# Patient Record
Sex: MALE | Race: ASIAN | NOT HISPANIC OR LATINO
[De-identification: names, ages, dates, MRNs, and addresses within clinical notes are randomized per-mention and may not be internally consistent; named-entity substitution may affect disease eponyms.]

---

## 2017-03-16 ENCOUNTER — APPOINTMENT (OUTPATIENT)
Dept: CARDIOLOGY | Facility: CLINIC | Age: 82
End: 2017-03-16

## 2018-09-11 ENCOUNTER — APPOINTMENT (OUTPATIENT)
Dept: OPHTHALMOLOGY | Facility: CLINIC | Age: 83
End: 2018-09-11

## 2018-11-06 ENCOUNTER — APPOINTMENT (OUTPATIENT)
Dept: OPHTHALMOLOGY | Facility: CLINIC | Age: 83
End: 2018-11-06
Payer: MEDICARE

## 2018-11-06 PROCEDURE — 92004 COMPRE OPH EXAM NEW PT 1/>: CPT

## 2018-11-06 PROCEDURE — 92250 FUNDUS PHOTOGRAPHY W/I&R: CPT

## 2018-11-06 PROCEDURE — 92020 GONIOSCOPY: CPT

## 2018-11-27 ENCOUNTER — APPOINTMENT (OUTPATIENT)
Dept: OPHTHALMOLOGY | Facility: CLINIC | Age: 83
End: 2018-11-27

## 2021-07-26 ENCOUNTER — APPOINTMENT (OUTPATIENT)
Dept: OPHTHALMOLOGY | Facility: CLINIC | Age: 86
End: 2021-07-26

## 2023-03-28 ENCOUNTER — INPATIENT (INPATIENT)
Facility: HOSPITAL | Age: 88
LOS: 6 days | Discharge: NURSING, REHAB OR LONG-TERM CARE | End: 2023-04-04
Attending: HOSPITALIST | Admitting: HOSPITALIST
Payer: MEDICARE

## 2023-03-28 VITALS
TEMPERATURE: 99 F | SYSTOLIC BLOOD PRESSURE: 150 MMHG | OXYGEN SATURATION: 100 % | DIASTOLIC BLOOD PRESSURE: 84 MMHG | RESPIRATION RATE: 16 BRPM | WEIGHT: 158.07 LBS | HEART RATE: 88 BPM

## 2023-03-28 DIAGNOSIS — E78.5 HYPERLIPIDEMIA, UNSPECIFIED: ICD-10-CM

## 2023-03-28 DIAGNOSIS — M79.18 MYALGIA, OTHER SITE: ICD-10-CM

## 2023-03-28 DIAGNOSIS — R93.89 ABNORMAL FINDINGS ON DIAGNOSTIC IMAGING OF OTHER SPECIFIED BODY STRUCTURES: ICD-10-CM

## 2023-03-28 DIAGNOSIS — Z29.9 ENCOUNTER FOR PROPHYLACTIC MEASURES, UNSPECIFIED: ICD-10-CM

## 2023-03-28 DIAGNOSIS — I63.9 CEREBRAL INFARCTION, UNSPECIFIED: ICD-10-CM
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
## 2023-03-28 DIAGNOSIS — E87.1 HYPO-OSMOLALITY AND HYPONATREMIA: ICD-10-CM

## 2023-03-28 DIAGNOSIS — R26.81 UNSTEADINESS ON FEET: ICD-10-CM

## 2023-03-28 DIAGNOSIS — R42 DIZZINESS AND GIDDINESS: ICD-10-CM

## 2023-03-28 DIAGNOSIS — E11.9 TYPE 2 DIABETES MELLITUS WITHOUT COMPLICATIONS: ICD-10-CM

## 2023-03-28 DIAGNOSIS — E83.52 HYPERCALCEMIA: ICD-10-CM

## 2023-03-28 DIAGNOSIS — I10 ESSENTIAL (PRIMARY) HYPERTENSION: ICD-10-CM

## 2023-03-28 LAB
GLUCOSE BLDC GLUCOMTR-MCNC: 266 MG/DL — HIGH (ref 70–99)
GLUCOSE BLDC GLUCOMTR-MCNC: 423 MG/DL — HIGH (ref 70–99)

## 2023-03-28 PROCEDURE — 71045 X-RAY EXAM CHEST 1 VIEW: CPT | Mod: 26

## 2023-03-28 PROCEDURE — 70498 CT ANGIOGRAPHY NECK: CPT | Mod: 26

## 2023-03-28 PROCEDURE — 70496 CT ANGIOGRAPHY HEAD: CPT | Mod: 26

## 2023-03-28 PROCEDURE — 99285 EMERGENCY DEPT VISIT HI MDM: CPT

## 2023-03-28 PROCEDURE — 99223 1ST HOSP IP/OBS HIGH 75: CPT

## 2023-03-28 RX ORDER — BRIMONIDINE TARTRATE 2 MG/MG
1 SOLUTION/ DROPS OPHTHALMIC
Refills: 0 | Status: DISCONTINUED | OUTPATIENT
Start: 2023-03-28 | End: 2023-04-04

## 2023-03-28 RX ORDER — LOSARTAN POTASSIUM 100 MG/1
1 TABLET, FILM COATED ORAL
Refills: 0 | DISCHARGE

## 2023-03-28 RX ORDER — SODIUM CHLORIDE 9 MG/ML
1000 INJECTION, SOLUTION INTRAVENOUS
Refills: 0 | Status: DISCONTINUED | OUTPATIENT
Start: 2023-03-28 | End: 2023-04-04

## 2023-03-28 RX ORDER — ASPIRIN/CALCIUM CARB/MAGNESIUM 324 MG
81 TABLET ORAL DAILY
Refills: 0 | Status: DISCONTINUED | OUTPATIENT
Start: 2023-03-28 | End: 2023-04-04

## 2023-03-28 RX ORDER — SODIUM CHLORIDE 9 MG/ML
500 INJECTION INTRAMUSCULAR; INTRAVENOUS; SUBCUTANEOUS ONCE
Refills: 0 | Status: DISCONTINUED | OUTPATIENT
Start: 2023-03-28 | End: 2023-04-04

## 2023-03-28 RX ORDER — METFORMIN HYDROCHLORIDE 850 MG/1
1 TABLET ORAL
Refills: 0 | DISCHARGE

## 2023-03-28 RX ORDER — AMLODIPINE BESYLATE 2.5 MG/1
1 TABLET ORAL
Refills: 0 | DISCHARGE

## 2023-03-28 RX ORDER — GLUCAGON INJECTION, SOLUTION 0.5 MG/.1ML
1 INJECTION, SOLUTION SUBCUTANEOUS ONCE
Refills: 0 | Status: DISCONTINUED | OUTPATIENT
Start: 2023-03-28 | End: 2023-04-04

## 2023-03-28 RX ORDER — LIDOCAINE 4 G/100G
1 CREAM TOPICAL DAILY
Refills: 0 | Status: DISCONTINUED | OUTPATIENT
Start: 2023-03-28 | End: 2023-04-04

## 2023-03-28 RX ORDER — INSULIN LISPRO 100/ML
VIAL (ML) SUBCUTANEOUS
Refills: 0 | Status: DISCONTINUED | OUTPATIENT
Start: 2023-03-28 | End: 2023-04-04

## 2023-03-28 RX ORDER — DEXTROSE 50 % IN WATER 50 %
12.5 SYRINGE (ML) INTRAVENOUS ONCE
Refills: 0 | Status: DISCONTINUED | OUTPATIENT
Start: 2023-03-28 | End: 2023-04-04

## 2023-03-28 RX ORDER — LOSARTAN POTASSIUM 100 MG/1
50 TABLET, FILM COATED ORAL DAILY
Refills: 0 | Status: DISCONTINUED | OUTPATIENT
Start: 2023-03-28 | End: 2023-04-04

## 2023-03-28 RX ORDER — LATANOPROST 0.05 MG/ML
1 SOLUTION/ DROPS OPHTHALMIC; TOPICAL AT BEDTIME
Refills: 0 | Status: DISCONTINUED | OUTPATIENT
Start: 2023-03-28 | End: 2023-04-04

## 2023-03-28 RX ORDER — INSULIN LISPRO 100/ML
VIAL (ML) SUBCUTANEOUS AT BEDTIME
Refills: 0 | Status: DISCONTINUED | OUTPATIENT
Start: 2023-03-28 | End: 2023-04-04

## 2023-03-28 RX ORDER — HEPARIN SODIUM 5000 [USP'U]/ML
5000 INJECTION INTRAVENOUS; SUBCUTANEOUS EVERY 8 HOURS
Refills: 0 | Status: DISCONTINUED | OUTPATIENT
Start: 2023-03-28 | End: 2023-04-04

## 2023-03-28 RX ORDER — DEXTROSE 50 % IN WATER 50 %
25 SYRINGE (ML) INTRAVENOUS ONCE
Refills: 0 | Status: DISCONTINUED | OUTPATIENT
Start: 2023-03-28 | End: 2023-04-04

## 2023-03-28 RX ORDER — TRAVOPROST 0.04 MG/ML
1 SOLUTION/ DROPS OPHTHALMIC
Refills: 0 | DISCHARGE

## 2023-03-28 RX ORDER — ACETAMINOPHEN 500 MG
650 TABLET ORAL EVERY 6 HOURS
Refills: 0 | Status: DISCONTINUED | OUTPATIENT
Start: 2023-03-28 | End: 2023-03-29

## 2023-03-28 RX ORDER — AMLODIPINE BESYLATE 2.5 MG/1
5 TABLET ORAL DAILY
Refills: 0 | Status: DISCONTINUED | OUTPATIENT
Start: 2023-03-28 | End: 2023-04-04

## 2023-03-28 RX ORDER — DEXTROSE 50 % IN WATER 50 %
15 SYRINGE (ML) INTRAVENOUS ONCE
Refills: 0 | Status: DISCONTINUED | OUTPATIENT
Start: 2023-03-28 | End: 2023-04-04

## 2023-03-28 RX ADMIN — HEPARIN SODIUM 5000 UNIT(S): 5000 INJECTION INTRAVENOUS; SUBCUTANEOUS at 21:27

## 2023-03-28 RX ADMIN — Medication 4: at 21:26

## 2023-03-28 NOTE — ED ADULT TRIAGE NOTE - CHIEF COMPLAINT QUOTE
downtime triage: fs 324    unwitnessed fall in bathtub yesterday. States possible syncope, c/o right rib pain , Left elbow  + head trama    PMH DM2, HTN, CVA 2016, Vertigo

## 2023-03-28 NOTE — H&P ADULT - NSHPSOCIALHISTORY_GEN_ALL_CORE
Lives at home with wife    Smoking - quit 15 years ago; prior to that smoked 2ppd for 3 years (6 ppy hx)  Drinking - stopped 2 years ago; prior to that, had 2 drinks daily  No recreational drug use

## 2023-03-28 NOTE — H&P ADULT - PROBLEM SELECTOR PLAN 7
- Hx of stroke  - CT brain scans negative for new stroke  - C/w aspirin, not on statin per family  - F/u lipid panel  - PT eval pending - Hx of stroke, no focal deficits noted on examination  - CT brain scans negative for evidence of new stroke  - C/w aspirin, not on statin per family  - Lipid panel in AM  - PT eval pending - Per family, no longer on statin  - Lipid panel in AM - On presentation, CMP glucose 345 with POCT   - Home regimen includes metformin 500 BID  - Will initiate ISS while inpatient  - Monitor FS - goal for 140-180  - A1c in AM

## 2023-03-28 NOTE — H&P ADULT - PROBLEM SELECTOR PLAN 11
DVT ppx: Heparin subQ  Diet: Consistent carb and DASH/TLC diet  Disposition: Pending clinical course  Code Status: Full code

## 2023-03-28 NOTE — H&P ADULT - PROBLEM SELECTOR PLAN 2
- Likely due to fall on right side and left elbow  - CXR not demonstrating any overt rib fractures  - Obtain Xray left elbow to r/o fracture  - Acetaminophen and lidocaine patch for analgesia  - Monitor pain levels

## 2023-03-28 NOTE — ED PROVIDER NOTE - OBJECTIVE STATEMENT
87-year-old male history diabetes, hypertension, CVA and vertigo presents status post unwitnessed fall in the bathtub yesterday evening.  Unclear if he lost consciousness though he does not exactly remember the circumstances of the fall, and he is endorsing a head strike.  Wife at bedside states that he has seemed more confused and out of it for the past week.  Currently seems to be at his baseline mental status per wife at bedside.  Reports mild headache and left elbow pain as well as right-sided rib pain.  Otherwise no chest pain no shortness of breath no lightheadedness no dizziness.  Per patient and wife.  He has been more unsteady on his feet of late and had more difficulty ambulating.  Otherwise no fevers, chills, sick contacts, nausea, vomiting, abdominal pain, diarrhea, other complaints at this time.

## 2023-03-28 NOTE — H&P ADULT - PROBLEM SELECTOR PLAN 10
DVT ppx: Heparin subQ  Diet: Consistent carb and DASH/TLC diet  Disposition: Pending clinical course  Code Status: Full code - Reported hx of vertigo, however, does not appear to be contributory to patient current presentation  - Home regimen includes meclizine  - Hold meclizine while inpatient  - PT evaluation pending

## 2023-03-28 NOTE — ED PROVIDER NOTE - PROGRESS NOTE DETAILS
Tony Mukherjee MD (PGY-3): Patient resting comfortably.  CT/CTA without any abnormalities.  No concern for intracranial injury or acute large vessel occlusion.  Case discussed with hospitalist who accepts patient for admission for syncope evaluation and PT eval.

## 2023-03-28 NOTE — H&P ADULT - PROBLEM SELECTOR PLAN 5
- On presentation, CMP glucose 345 with POCT   - Home regimen includes metformin 500 BID  - Will initiate ISS while inpatient  - Monitor FS - goal for 140-180  - A1c in AM - On presentation,   - Home regimen includes losartan 50 and amlodipine 5  - C/w home regimen with hold parameters  - Monitor blood pressure and follow-up orthostatic hypotension results - CXR notes possible aneurysmal aorta, new finding since last CXR  - Patient is currently asymptomatic, will recommend that patient receive CTA chest Aorta on discharge for further characterization - CXR notes possible aneurysmal aorta, new finding since last CXR  - Patient is currently asymptomatic, recommend that patient follow-up outpatient with primary care doctor for further evaluation

## 2023-03-28 NOTE — H&P ADULT - PROBLEM SELECTOR PLAN 3
- On admission, Na 130  - Glucose on admission 345; corrected Na 134  - Will give 500 cc bolus and reassess Na with AM labs  - Trend BMP
No

## 2023-03-28 NOTE — H&P ADULT - HISTORY OF PRESENT ILLNESS
87M PMHx HTN, T2DM, CVA, vertigo presents s/p unwitnessed fall.    In the ED:  VS: /84, HR 88, RR 16, O2 Sat 100 on RA, Tmax 98.8  Labs: CBC WNL. Coags WNL. CMP - Na 130, BUN/Cr 18/0.73, glucose 345, Ca 10.6. RVP negative.  Imaging: CTH negative, CTA head and neck negative. CXR with possible aneurysmal aorta, otherwise negative. 87M PMHx HTN, T2DM, CVA, vertigo presents s/p unwitnessed fall. Patient states that he was attempting to use the bathroom on the morning of admission and admits to slipping on a wet spot on the floor. He says that he fell on his right side, including head. He denies loss of consciousness. He states he was on the floor for approximately 3 minutes when his wife and son came to help him up. He was transported to the ED immediately for evaluation. Patient states that prior to the fall, he did not experience any chest pain, lightheadedness, dizziness, palpitations, extremity weakness.    In the ED:  VS: /84, HR 88, RR 16, O2 Sat 100 on RA, Tmax 98.8  Labs: CBC WNL. Coags WNL. CMP - Na 130, BUN/Cr 18/0.73, glucose 345, Ca 10.6. RVP negative.  Imaging: CTH negative, CTA head and neck negative. CXR with possible aneurysmal aorta, otherwise negative.  EKG: NSR at 82 bpm, left atrial enlargement, left axis deviation, left ventricular hypertrophy with QRS widening, QTc 450. 87M PMHx HTN, T2DM, CVA, vertigo presents s/p unwitnessed fall. Patient states that he was attempting to use the bathroom on the morning of admission and admits to slipping on a wet spot on the floor. He says that he fell on his right side, including head. He denies loss of consciousness. He states he was on the floor for approximately 3 minutes when his wife and son came to help him up. He was transported to the ED immediately for evaluation. Patient states that prior to the fall, he did not experience any chest pain, lightheadedness, dizziness, palpitations, extremity weakness, or any other related symptoms.    In the ED:  VS: /84, HR 88, RR 16, O2 Sat 100 on RA, Tmax 98.8  Labs: CBC WNL. Coags WNL. CMP - Na 130, BUN/Cr 18/0.73, glucose 345, Ca 10.6. RVP negative.  Imaging: CTH negative, CTA head and neck negative. CXR with possible aneurysmal aorta, otherwise negative.  EKG: NSR at 82 bpm, left atrial enlargement, left axis deviation, left ventricular hypertrophy with QRS widening, QTc 450.

## 2023-03-28 NOTE — H&P ADULT - PROBLEM SELECTOR PLAN 9
- Reported hx of vertigo, however, does not appear to be contributory to patient current presentation  - Home regimen includes meclizine  - Hold meclizine while inpatient  - PT evaluation pending - Hx of stroke, no focal deficits noted on examination  - CT brain scans negative for evidence of new stroke  - C/w aspirin, not on statin per family  - Lipid panel in AM  - PT evaluation pending

## 2023-03-28 NOTE — H&P ADULT - PROBLEM SELECTOR PLAN 1
- Acute presentation for unwitnessed fall; per patient and son, appears to be mechanical in nature. Less likely but other ddx to be considered includes cardiogenic syncope, stroke, infectious (i.e. UTI), volume depletion  - CTH negative, CTA head and neck negative  - Will obtain UA/UCx to r/o UTI  - Will obtain orthostatic vital signs to assess for volume depletion  - EKG demonstrating NSR at 82 bpm, left atrial enlargement, left axis deviation, left ventricular hypertrophy with QRS widening, QTc 450. Will obtain TTE to rule out cardiac abnormality  - PT evaluation for disposition planning

## 2023-03-28 NOTE — H&P ADULT - PROBLEM SELECTOR PLAN 8
DVT ppx: Heparin subQ  Diet: Consistent carb and DASH/TLC diet  Disposition: Pending clinical course  Code Status: Full code - Hx of stroke, no focal deficits noted on examination  - CT brain scans negative for evidence of new stroke  - C/w aspirin, not on statin per family  - Lipid panel in AM  - PT evaluation pending - Per family, no longer on statin  - Lipid panel in AM

## 2023-03-28 NOTE — ED PROVIDER NOTE - ATTENDING CONTRIBUTION TO CARE
I (Dhruv) agree with above, I performed a history and physical. Counseled [facility] medical staff, physician assistant, and/or medical student on medical decision making as documented. Medical decisions and treatment interventions were made in real time during the patient encounter. Additionally and/or with the following exceptions:Patient is 87-year-old past medical history diabetes, hypertension, CVA, vertigo who had an unwitnessed fall.  It is unclear if he lost consciousness.  Wife also states that he seemed more confused.  He was 5 out of 5 in all extremities, ambulation was initially deferred.  Otherwise well-appearing.  CBC within normal limits, coagulation studies within normal limits, comprehensive metabolic panel normal, virology studies negative.  EKG was nonischemic, CTA of the head and neck was negative patient was endorsed to the medical team for high risk syncope work-up.  Required admission for continued work-up.  The patient's condition was not amenable to outpatient treatment due either the lack of feasibility of outpatient care coordination, possibility for further decompensation with adverse outcome if discharge, or treatments and diagnostic  modalities only available during an inpatient hospitalization.

## 2023-03-28 NOTE — H&P ADULT - PROBLEM SELECTOR PLAN 4
- On presentation,   - Home regimen includes losartan 50 and amlodipine 5  - C/w home regimen with hold parameters  - Monitor blood pressure and follow-up orthostatic hypotension results - Mildly elevated calcium to 10.6  - S/p 500 cc bolus of NS  - Trend BMP for resolution of hyperCa

## 2023-03-28 NOTE — ED PROVIDER NOTE - PHYSICAL EXAMINATION
PHYSICAL EXAM:  GENERAL: Sitting comfortable in bed, in no acute distress  HENMT: Atraumatic, moist mucous membranes, no oropharyngeal exudates or vesicles, uvula is midline EYES: Clear bilaterally, PERRL, EOMs intact b/l  HEART: RRR, S1/S2, no murmur/gallops/rubs  RESPIRATORY: Clear to auscultation bilaterally, no wheezes/rhonchi/rales  ABDOMEN: +BS, soft, nontender, nondistended  EXTREMITIES: No lower extremity edema, +2 radial pulses b/l  NEURO:  A&Ox4, no focal motor deficits or sensory deficits   Heme/LYMPH: No ecchymosis or bruising, no anterior/posterior cervical or supraclavicular LAD  SKIN:  Skin normal, warm, dry and intact. No evidence of rash.

## 2023-03-28 NOTE — H&P ADULT - PROBLEM SELECTOR PLAN 6
- Per family, no longer on statin  - Lipid panel in AM - On presentation, CMP glucose 345 with POCT   - Home regimen includes metformin 500 BID  - Will initiate ISS while inpatient  - Monitor FS - goal for 140-180  - A1c in AM - On presentation,   - Home regimen includes losartan 50 and amlodipine 5  - C/w home regimen with hold parameters  - Monitor blood pressure and follow-up orthostatic hypotension results

## 2023-03-28 NOTE — ED PROVIDER NOTE - CLINICAL SUMMARY MEDICAL DECISION MAKING FREE TEXT BOX
Tnoy Mukherjee MD (PGY-3): 87-year-old male history of remote CVA, hypertension, diabetes, vertigo presents status post unwitnessed fall yesterday evening cannot remember if he lost consciousness.  Positive head strike.  Now complaining of right-sided rib pain.  Vital signs grossly within normal limits.    Patient without significant focal neurologic findings on exam.  Concern for syncope.  Will obtain CBC, CMP to evaluate for anemia and electrolyte abnormalities.  Will obtain chest x-ray to evaluate for underlying pneumonia/rib fractures from fall.  Will obtain CT Noncon and CTA to evaluate for possible stroke.  And reassess

## 2023-03-28 NOTE — H&P ADULT - NSICDXPASTMEDICALHX_GEN_ALL_CORE_FT
PAST MEDICAL HISTORY:  CVA (cerebrovascular accident)     HTN (hypertension)     Hyperlipidemia     Type 2 diabetes mellitus

## 2023-03-28 NOTE — H&P ADULT - ASSESSMENT
87M PMHx HTN, T2DM, CVA, vertigo presents s/p unwitnessed fall. 87M PMHx HTN, T2DM, CVA, vertigo presents s/p unwitnessed fall likely of mechanical origin.

## 2023-03-29 DIAGNOSIS — R55 SYNCOPE AND COLLAPSE: ICD-10-CM

## 2023-03-29 LAB
A1C WITH ESTIMATED AVERAGE GLUCOSE RESULT: 9.7 % — HIGH (ref 4–5.6)
ANION GAP SERPL CALC-SCNC: 14 MMOL/L — SIGNIFICANT CHANGE UP (ref 7–14)
BUN SERPL-MCNC: 18 MG/DL — SIGNIFICANT CHANGE UP (ref 7–23)
CALCIUM SERPL-MCNC: 9.7 MG/DL — SIGNIFICANT CHANGE UP (ref 8.4–10.5)
CHLORIDE SERPL-SCNC: 102 MMOL/L — SIGNIFICANT CHANGE UP (ref 98–107)
CHOLEST SERPL-MCNC: 198 MG/DL — SIGNIFICANT CHANGE UP
CO2 SERPL-SCNC: 21 MMOL/L — LOW (ref 22–31)
CREAT SERPL-MCNC: 0.75 MG/DL — SIGNIFICANT CHANGE UP (ref 0.5–1.3)
EGFR: 88 ML/MIN/1.73M2 — SIGNIFICANT CHANGE UP
ESTIMATED AVERAGE GLUCOSE: 232 — SIGNIFICANT CHANGE UP
GLUCOSE BLDC GLUCOMTR-MCNC: 201 MG/DL — HIGH (ref 70–99)
GLUCOSE SERPL-MCNC: 210 MG/DL — HIGH (ref 70–99)
HDLC SERPL-MCNC: 56 MG/DL — SIGNIFICANT CHANGE UP
LIPID PNL WITH DIRECT LDL SERPL: 121 MG/DL — HIGH
MAGNESIUM SERPL-MCNC: 2 MG/DL — SIGNIFICANT CHANGE UP (ref 1.6–2.6)
NON HDL CHOLESTEROL: 142 MG/DL — HIGH
PHOSPHATE SERPL-MCNC: 2 MG/DL — LOW (ref 2.5–4.5)
POTASSIUM SERPL-MCNC: 3.6 MMOL/L — SIGNIFICANT CHANGE UP (ref 3.5–5.3)
POTASSIUM SERPL-SCNC: 3.6 MMOL/L — SIGNIFICANT CHANGE UP (ref 3.5–5.3)
SODIUM SERPL-SCNC: 137 MMOL/L — SIGNIFICANT CHANGE UP (ref 135–145)
SODIUM UR-SCNC: 68 MMOL/L — SIGNIFICANT CHANGE UP
TRIGL SERPL-MCNC: 107 MG/DL — SIGNIFICANT CHANGE UP

## 2023-03-29 PROCEDURE — 93306 TTE W/DOPPLER COMPLETE: CPT | Mod: 26

## 2023-03-29 PROCEDURE — 99233 SBSQ HOSP IP/OBS HIGH 50: CPT

## 2023-03-29 PROCEDURE — 73070 X-RAY EXAM OF ELBOW: CPT | Mod: 26,LT

## 2023-03-29 RX ORDER — POLYETHYLENE GLYCOL 3350 17 G/17G
17 POWDER, FOR SOLUTION ORAL DAILY
Refills: 0 | Status: DISCONTINUED | OUTPATIENT
Start: 2023-03-29 | End: 2023-03-30

## 2023-03-29 RX ORDER — SENNA PLUS 8.6 MG/1
2 TABLET ORAL AT BEDTIME
Refills: 0 | Status: DISCONTINUED | OUTPATIENT
Start: 2023-03-29 | End: 2023-04-04

## 2023-03-29 RX ORDER — ACETAMINOPHEN 500 MG
650 TABLET ORAL EVERY 6 HOURS
Refills: 0 | Status: DISCONTINUED | OUTPATIENT
Start: 2023-03-29 | End: 2023-04-04

## 2023-03-29 RX ORDER — SODIUM,POTASSIUM PHOSPHATES 278-250MG
1 POWDER IN PACKET (EA) ORAL
Refills: 0 | Status: COMPLETED | OUTPATIENT
Start: 2023-03-29 | End: 2023-03-30

## 2023-03-29 RX ORDER — INFLUENZA VIRUS VACCINE 15; 15; 15; 15 UG/.5ML; UG/.5ML; UG/.5ML; UG/.5ML
0.7 SUSPENSION INTRAMUSCULAR ONCE
Refills: 0 | Status: DISCONTINUED | OUTPATIENT
Start: 2023-03-29 | End: 2023-04-04

## 2023-03-29 RX ORDER — TRAMADOL HYDROCHLORIDE 50 MG/1
25 TABLET ORAL EVERY 8 HOURS
Refills: 0 | Status: DISCONTINUED | OUTPATIENT
Start: 2023-03-29 | End: 2023-04-04

## 2023-03-29 RX ADMIN — Medication 2: at 08:51

## 2023-03-29 RX ADMIN — BRIMONIDINE TARTRATE 1 DROP(S): 2 SOLUTION/ DROPS OPHTHALMIC at 17:37

## 2023-03-29 RX ADMIN — Medication 81 MILLIGRAM(S): at 17:36

## 2023-03-29 RX ADMIN — SENNA PLUS 2 TABLET(S): 8.6 TABLET ORAL at 21:58

## 2023-03-29 RX ADMIN — Medication 1 PACKET(S): at 11:49

## 2023-03-29 RX ADMIN — Medication 1 PACKET(S): at 17:33

## 2023-03-29 RX ADMIN — BRIMONIDINE TARTRATE 1 DROP(S): 2 SOLUTION/ DROPS OPHTHALMIC at 06:42

## 2023-03-29 RX ADMIN — HEPARIN SODIUM 5000 UNIT(S): 5000 INJECTION INTRAVENOUS; SUBCUTANEOUS at 06:25

## 2023-03-29 RX ADMIN — Medication 650 MILLIGRAM(S): at 02:00

## 2023-03-29 RX ADMIN — HEPARIN SODIUM 5000 UNIT(S): 5000 INJECTION INTRAVENOUS; SUBCUTANEOUS at 17:34

## 2023-03-29 RX ADMIN — LOSARTAN POTASSIUM 50 MILLIGRAM(S): 100 TABLET, FILM COATED ORAL at 06:25

## 2023-03-29 RX ADMIN — HEPARIN SODIUM 5000 UNIT(S): 5000 INJECTION INTRAVENOUS; SUBCUTANEOUS at 21:51

## 2023-03-29 RX ADMIN — Medication 650 MILLIGRAM(S): at 22:07

## 2023-03-29 RX ADMIN — Medication 2: at 17:55

## 2023-03-29 RX ADMIN — LIDOCAINE 1 PATCH: 4 CREAM TOPICAL at 17:34

## 2023-03-29 RX ADMIN — LATANOPROST 1 DROP(S): 0.05 SOLUTION/ DROPS OPHTHALMIC; TOPICAL at 21:52

## 2023-03-29 RX ADMIN — AMLODIPINE BESYLATE 5 MILLIGRAM(S): 2.5 TABLET ORAL at 06:25

## 2023-03-29 NOTE — PATIENT PROFILE ADULT - FALL HARM RISK - HARM RISK INTERVENTIONS
Assistance with ambulation/Assistance OOB with selected safe patient handling equipment/Communicate Risk of Fall with Harm to all staff/Discuss with provider need for PT consult/Monitor gait and stability/Reinforce activity limits and safety measures with patient and family/Sit up slowly, dangle for a short time, stand at bedside before walking/Tailored Fall Risk Interventions/Visual Cue: Yellow wristband and red socks/Bed in lowest position, wheels locked, appropriate side rails in place/Call bell, personal items and telephone in reach/Instruct patient to call for assistance before getting out of bed or chair/Non-slip footwear when patient is out of bed/[place] to call system/Physically safe environment - no spills, clutter or unnecessary equipment/Purposeful Proactive Rounding/Room/bathroom lighting operational, light cord in reach

## 2023-03-29 NOTE — PATIENT PROFILE ADULT - NSPROPTRIGHTBILLOFRIGHTS_GEN_A_NUR
Cmp, ck, a1c, ldl direct.
Last labs 6/27/18 -  Full set done + A1C and CK  Would you like patient to get any repeat labs done now?   Please advise
Patient advised and verbalized understanding. Appointment scheduled.   Future Appointments   Date Time Provider Maximiliano Rdz   1/12/2019  8:30 AM [facility] NURSE [facility]
Pt called stated he thinks it's time for him to get labs done but he was wondering if it is non fasting labs.
patient

## 2023-03-30 LAB
ANION GAP SERPL CALC-SCNC: 11 MMOL/L — SIGNIFICANT CHANGE UP (ref 7–14)
BUN SERPL-MCNC: 18 MG/DL — SIGNIFICANT CHANGE UP (ref 7–23)
CALCIUM SERPL-MCNC: 9.7 MG/DL — SIGNIFICANT CHANGE UP (ref 8.4–10.5)
CHLORIDE SERPL-SCNC: 102 MMOL/L — SIGNIFICANT CHANGE UP (ref 98–107)
CO2 SERPL-SCNC: 23 MMOL/L — SIGNIFICANT CHANGE UP (ref 22–31)
CREAT SERPL-MCNC: 0.75 MG/DL — SIGNIFICANT CHANGE UP (ref 0.5–1.3)
EGFR: 88 ML/MIN/1.73M2 — SIGNIFICANT CHANGE UP
GLUCOSE SERPL-MCNC: 220 MG/DL — HIGH (ref 70–99)
HCT VFR BLD CALC: 42.1 % — SIGNIFICANT CHANGE UP (ref 39–50)
HGB BLD-MCNC: 14 G/DL — SIGNIFICANT CHANGE UP (ref 13–17)
MAGNESIUM SERPL-MCNC: 1.9 MG/DL — SIGNIFICANT CHANGE UP (ref 1.6–2.6)
MCHC RBC-ENTMCNC: 30.8 PG — SIGNIFICANT CHANGE UP (ref 27–34)
MCHC RBC-ENTMCNC: 33.3 GM/DL — SIGNIFICANT CHANGE UP (ref 32–36)
MCV RBC AUTO: 92.5 FL — SIGNIFICANT CHANGE UP (ref 80–100)
NRBC # BLD: 0 /100 WBCS — SIGNIFICANT CHANGE UP (ref 0–0)
NRBC # FLD: 0 K/UL — SIGNIFICANT CHANGE UP (ref 0–0)
PHOSPHATE SERPL-MCNC: 2.7 MG/DL — SIGNIFICANT CHANGE UP (ref 2.5–4.5)
PLATELET # BLD AUTO: 224 K/UL — SIGNIFICANT CHANGE UP (ref 150–400)
POTASSIUM SERPL-MCNC: 4.1 MMOL/L — SIGNIFICANT CHANGE UP (ref 3.5–5.3)
POTASSIUM SERPL-SCNC: 4.1 MMOL/L — SIGNIFICANT CHANGE UP (ref 3.5–5.3)
RBC # BLD: 4.55 M/UL — SIGNIFICANT CHANGE UP (ref 4.2–5.8)
RBC # FLD: 13.9 % — SIGNIFICANT CHANGE UP (ref 10.3–14.5)
SODIUM SERPL-SCNC: 136 MMOL/L — SIGNIFICANT CHANGE UP (ref 135–145)
WBC # BLD: 8.54 K/UL — SIGNIFICANT CHANGE UP (ref 3.8–10.5)
WBC # FLD AUTO: 8.54 K/UL — SIGNIFICANT CHANGE UP (ref 3.8–10.5)

## 2023-03-30 PROCEDURE — 99233 SBSQ HOSP IP/OBS HIGH 50: CPT

## 2023-03-30 RX ORDER — CHLORHEXIDINE GLUCONATE 213 G/1000ML
1 SOLUTION TOPICAL DAILY
Refills: 0 | Status: DISCONTINUED | OUTPATIENT
Start: 2023-03-30 | End: 2023-04-04

## 2023-03-30 RX ORDER — KETOROLAC TROMETHAMINE 30 MG/ML
15 SYRINGE (ML) INJECTION ONCE
Refills: 0 | Status: DISCONTINUED | OUTPATIENT
Start: 2023-03-30 | End: 2023-03-30

## 2023-03-30 RX ORDER — POLYETHYLENE GLYCOL 3350 17 G/17G
17 POWDER, FOR SOLUTION ORAL
Refills: 0 | Status: DISCONTINUED | OUTPATIENT
Start: 2023-03-30 | End: 2023-04-04

## 2023-03-30 RX ADMIN — LIDOCAINE 1 PATCH: 4 CREAM TOPICAL at 06:16

## 2023-03-30 RX ADMIN — BRIMONIDINE TARTRATE 1 DROP(S): 2 SOLUTION/ DROPS OPHTHALMIC at 17:56

## 2023-03-30 RX ADMIN — LOSARTAN POTASSIUM 50 MILLIGRAM(S): 100 TABLET, FILM COATED ORAL at 05:12

## 2023-03-30 RX ADMIN — HEPARIN SODIUM 5000 UNIT(S): 5000 INJECTION INTRAVENOUS; SUBCUTANEOUS at 15:06

## 2023-03-30 RX ADMIN — Medication 15 MILLIGRAM(S): at 15:06

## 2023-03-30 RX ADMIN — TRAMADOL HYDROCHLORIDE 25 MILLIGRAM(S): 50 TABLET ORAL at 11:10

## 2023-03-30 RX ADMIN — HEPARIN SODIUM 5000 UNIT(S): 5000 INJECTION INTRAVENOUS; SUBCUTANEOUS at 21:22

## 2023-03-30 RX ADMIN — Medication 1: at 17:56

## 2023-03-30 RX ADMIN — Medication 1 PACKET(S): at 10:40

## 2023-03-30 RX ADMIN — BRIMONIDINE TARTRATE 1 DROP(S): 2 SOLUTION/ DROPS OPHTHALMIC at 05:13

## 2023-03-30 RX ADMIN — SENNA PLUS 2 TABLET(S): 8.6 TABLET ORAL at 21:23

## 2023-03-30 RX ADMIN — POLYETHYLENE GLYCOL 3350 17 GRAM(S): 17 POWDER, FOR SOLUTION ORAL at 17:55

## 2023-03-30 RX ADMIN — LATANOPROST 1 DROP(S): 0.05 SOLUTION/ DROPS OPHTHALMIC; TOPICAL at 21:23

## 2023-03-30 RX ADMIN — HEPARIN SODIUM 5000 UNIT(S): 5000 INJECTION INTRAVENOUS; SUBCUTANEOUS at 05:13

## 2023-03-30 RX ADMIN — TRAMADOL HYDROCHLORIDE 25 MILLIGRAM(S): 50 TABLET ORAL at 10:39

## 2023-03-30 RX ADMIN — LIDOCAINE 1 PATCH: 4 CREAM TOPICAL at 17:56

## 2023-03-30 RX ADMIN — CHLORHEXIDINE GLUCONATE 1 APPLICATION(S): 213 SOLUTION TOPICAL at 11:37

## 2023-03-30 RX ADMIN — LIDOCAINE 1 PATCH: 4 CREAM TOPICAL at 11:35

## 2023-03-30 RX ADMIN — Medication 1: at 12:55

## 2023-03-30 RX ADMIN — Medication 15 MILLIGRAM(S): at 15:20

## 2023-03-30 RX ADMIN — Medication 1: at 09:34

## 2023-03-30 RX ADMIN — Medication 81 MILLIGRAM(S): at 11:34

## 2023-03-30 RX ADMIN — AMLODIPINE BESYLATE 5 MILLIGRAM(S): 2.5 TABLET ORAL at 05:13

## 2023-03-30 NOTE — PHYSICAL THERAPY INITIAL EVALUATION ADULT - ADDITIONAL COMMENTS
Pt states he lives with his wife in an apartment with no stairs to enter, and remains on the first floor. Prior to admission, pt was independent with ADLs and ambulated with a rolling walker independently. Pt states he slipped in the bathroom and fell a few days ago.   Post PT evaluation, pt left semi-supine, alarm on, call bell and remote within reach, all precautions maintained, NAD. FLORES guo.

## 2023-03-30 NOTE — PHYSICAL THERAPY INITIAL EVALUATION ADULT - LEVEL OF INDEPENDENCE: SIT/STAND, REHAB EVAL
Pt with complaints of dizziness upon standing, requesting to lay back down. Pt returned to supine, BP: 159/73, RN aware, nursing staff present in room/minimum assist (75% patients effort)

## 2023-03-30 NOTE — PHYSICAL THERAPY INITIAL EVALUATION ADULT - GENERAL OBSERVATIONS, REHAB EVAL
Pt received semi-supine in bed, with all lines intact, NAD, all precautions maintained. BP: 147/71, HR: 76, SpO2: 98% on RA

## 2023-03-31 ENCOUNTER — TRANSCRIPTION ENCOUNTER (OUTPATIENT)
Age: 88
End: 2023-03-31

## 2023-03-31 LAB
ANION GAP SERPL CALC-SCNC: 9 MMOL/L — SIGNIFICANT CHANGE UP (ref 7–14)
BUN SERPL-MCNC: 26 MG/DL — HIGH (ref 7–23)
CALCIUM SERPL-MCNC: 9.8 MG/DL — SIGNIFICANT CHANGE UP (ref 8.4–10.5)
CHLORIDE SERPL-SCNC: 101 MMOL/L — SIGNIFICANT CHANGE UP (ref 98–107)
CO2 SERPL-SCNC: 23 MMOL/L — SIGNIFICANT CHANGE UP (ref 22–31)
CREAT SERPL-MCNC: 0.81 MG/DL — SIGNIFICANT CHANGE UP (ref 0.5–1.3)
EGFR: 86 ML/MIN/1.73M2 — SIGNIFICANT CHANGE UP
GLUCOSE SERPL-MCNC: 204 MG/DL — HIGH (ref 70–99)
HCT VFR BLD CALC: 43 % — SIGNIFICANT CHANGE UP (ref 39–50)
HGB BLD-MCNC: 14.2 G/DL — SIGNIFICANT CHANGE UP (ref 13–17)
MAGNESIUM SERPL-MCNC: 2 MG/DL — SIGNIFICANT CHANGE UP (ref 1.6–2.6)
MCHC RBC-ENTMCNC: 30.5 PG — SIGNIFICANT CHANGE UP (ref 27–34)
MCHC RBC-ENTMCNC: 33 GM/DL — SIGNIFICANT CHANGE UP (ref 32–36)
MCV RBC AUTO: 92.5 FL — SIGNIFICANT CHANGE UP (ref 80–100)
MRSA PCR RESULT.: SIGNIFICANT CHANGE UP
NRBC # BLD: 0 /100 WBCS — SIGNIFICANT CHANGE UP (ref 0–0)
NRBC # FLD: 0 K/UL — SIGNIFICANT CHANGE UP (ref 0–0)
PHOSPHATE SERPL-MCNC: 3.1 MG/DL — SIGNIFICANT CHANGE UP (ref 2.5–4.5)
PLATELET # BLD AUTO: 218 K/UL — SIGNIFICANT CHANGE UP (ref 150–400)
POTASSIUM SERPL-MCNC: 4.3 MMOL/L — SIGNIFICANT CHANGE UP (ref 3.5–5.3)
POTASSIUM SERPL-SCNC: 4.3 MMOL/L — SIGNIFICANT CHANGE UP (ref 3.5–5.3)
RBC # BLD: 4.65 M/UL — SIGNIFICANT CHANGE UP (ref 4.2–5.8)
RBC # FLD: 14.2 % — SIGNIFICANT CHANGE UP (ref 10.3–14.5)
S AUREUS DNA NOSE QL NAA+PROBE: SIGNIFICANT CHANGE UP
SODIUM SERPL-SCNC: 133 MMOL/L — LOW (ref 135–145)
WBC # BLD: 7.12 K/UL — SIGNIFICANT CHANGE UP (ref 3.8–10.5)
WBC # FLD AUTO: 7.12 K/UL — SIGNIFICANT CHANGE UP (ref 3.8–10.5)

## 2023-03-31 PROCEDURE — 99233 SBSQ HOSP IP/OBS HIGH 50: CPT

## 2023-03-31 PROCEDURE — 71250 CT THORAX DX C-: CPT | Mod: 26

## 2023-03-31 RX ADMIN — HEPARIN SODIUM 5000 UNIT(S): 5000 INJECTION INTRAVENOUS; SUBCUTANEOUS at 21:20

## 2023-03-31 RX ADMIN — Medication 1: at 18:31

## 2023-03-31 RX ADMIN — POLYETHYLENE GLYCOL 3350 17 GRAM(S): 17 POWDER, FOR SOLUTION ORAL at 05:17

## 2023-03-31 RX ADMIN — Medication 650 MILLIGRAM(S): at 16:49

## 2023-03-31 RX ADMIN — AMLODIPINE BESYLATE 5 MILLIGRAM(S): 2.5 TABLET ORAL at 05:17

## 2023-03-31 RX ADMIN — SENNA PLUS 2 TABLET(S): 8.6 TABLET ORAL at 21:20

## 2023-03-31 RX ADMIN — BRIMONIDINE TARTRATE 1 DROP(S): 2 SOLUTION/ DROPS OPHTHALMIC at 05:18

## 2023-03-31 RX ADMIN — BRIMONIDINE TARTRATE 1 DROP(S): 2 SOLUTION/ DROPS OPHTHALMIC at 18:33

## 2023-03-31 RX ADMIN — CHLORHEXIDINE GLUCONATE 1 APPLICATION(S): 213 SOLUTION TOPICAL at 13:44

## 2023-03-31 RX ADMIN — Medication 650 MILLIGRAM(S): at 15:49

## 2023-03-31 RX ADMIN — HEPARIN SODIUM 5000 UNIT(S): 5000 INJECTION INTRAVENOUS; SUBCUTANEOUS at 05:17

## 2023-03-31 RX ADMIN — LATANOPROST 1 DROP(S): 0.05 SOLUTION/ DROPS OPHTHALMIC; TOPICAL at 21:20

## 2023-03-31 RX ADMIN — LIDOCAINE 1 PATCH: 4 CREAM TOPICAL at 13:46

## 2023-03-31 RX ADMIN — LIDOCAINE 1 PATCH: 4 CREAM TOPICAL at 19:49

## 2023-03-31 RX ADMIN — Medication 1: at 09:19

## 2023-03-31 RX ADMIN — LOSARTAN POTASSIUM 50 MILLIGRAM(S): 100 TABLET, FILM COATED ORAL at 05:17

## 2023-03-31 RX ADMIN — Medication 81 MILLIGRAM(S): at 13:41

## 2023-03-31 RX ADMIN — Medication 2: at 13:39

## 2023-03-31 RX ADMIN — HEPARIN SODIUM 5000 UNIT(S): 5000 INJECTION INTRAVENOUS; SUBCUTANEOUS at 13:43

## 2023-03-31 NOTE — DISCHARGE NOTE PROVIDER - NSDCCPTREATMENT_GEN_ALL_CORE_FT
PRINCIPAL PROCEDURE  Procedure: CT  Findings and Treatment:   < end of copied text >  Brain CT:  No acute hemorrhage, hydrocephalus, midline shift or extra-axial   collections are identified. Age-appropriate involutional and moderate   microvascular ischemic changes are present.  The orbits are not remarkable in appearance. Right lens replacement is   noted. Left ethmoid sinus mucosal thickening is identified.  The tympanomastoid cavities are free of acute disease.  Neck CTA:  The aortic arch and greatvessels are patent. The common carotid,   internal and external carotid arteries are patent. Minimal calcified   plaque is noted at the proximal right internal carotid artery without   significant stenosis.  Brain CTA:  No large vessel occlusion is identified. Calcification is noted along the   proximal aspect of the V4 segments bilaterally contributing to mild   stenosis. Fairly dense calcifications are noted along the cavernous and   supraclinoid internal carotid arteries contributing to mild stenosis.  No vascular aneurysm is identified. The anterior communicating artery is   normal in appearance. No abnormal vessels are visualized.  IMPRESSION:  Brain CT: No acute hemorrhage, mass effect or extra-axial collections.  Neck CTA: No hemodynamically significant stenosis.  Brain CTA: No large vessel occlusion or high-grade stenosis.< from: CT Angio Head w/ IV Cont (03.28.23 @ 12:18) >

## 2023-03-31 NOTE — DISCHARGE NOTE PROVIDER - CARE PROVIDER_API CALL
PAULY CALDWELL  Internal Medicine  [facility] [place] [address]  Phone: [phone]  Fax: [phone]  Follow Up Time:

## 2023-03-31 NOTE — DISCHARGE NOTE PROVIDER - NSDCCPCAREPLAN_GEN_ALL_CORE_FT
PRINCIPAL DISCHARGE DIAGNOSIS  Diagnosis: Syncope  Assessment and Plan of Treatment: You were evaluated for any cardiac or neurological deficit for cause of syncope.  You had CT of head, neck which were negative for cause of syncope. You had echocardiogram and no normal cardiac function. You were on telemetry monitoring while in-patient but did not show any arrythmia.      SECONDARY DISCHARGE DIAGNOSES  Diagnosis: Musculoskeletal pain  Assessment and Plan of Treatment: You were evaluated for fractures.  X-rays did not reveal any fractures

## 2023-03-31 NOTE — DISCHARGE NOTE PROVIDER - NSDCMRMEDTOKEN_GEN_ALL_CORE_FT
amLODIPine 5 mg oral tablet: 1 tab(s) orally once a day  aspirin 81 mg oral tablet, chewable: 1 tab(s) orally once a day  Combigan 0.2%-0.5% ophthalmic solution: 1 drop(s) to each affected eye every 12 hours  losartan 50 mg oral tablet: 1 tab(s) orally once a day  metFORMIN 500 mg oral tablet: 1 tab(s) orally 2 times a day  travoprost 0.004% ophthalmic solution: 1 drop(s) in each eye once a day (in the evening)   acetaminophen 325 mg oral tablet: 2 tab(s) orally every 6 hours As needed Temp greater or equal to 38C (100.4F), Mild Pain (1 - 3)  amLODIPine 5 mg oral tablet: 1 tab(s) orally once a day  aspirin 81 mg oral tablet, chewable: 1 tab(s) orally once a day  Combigan 0.2%-0.5% ophthalmic solution: 1 drop(s) to each affected eye every 12 hours  losartan 50 mg oral tablet: 1 tab(s) orally once a day  meclizine 12.5 mg oral tablet: 1 tab(s) orally every 8 hours As needed Dizziness  metFORMIN 500 mg oral tablet: 1 tab(s) orally 2 times a day  polyethylene glycol 3350 oral powder for reconstitution: 17 gram(s) orally 2 times a day  senna leaf extract oral tablet: 2 tab(s) orally once a day (at bedtime)  traMADol 50 mg oral tablet: 0.5 tab(s) orally every 8 hours As needed moderate to severe  travoprost 0.004% ophthalmic solution: 1 drop(s) in each eye once a day (in the evening)

## 2023-03-31 NOTE — DISCHARGE NOTE PROVIDER - HOSPITAL COURSE
87M HTN, DM2 - A1c 9.7, glaucoma, CVA, p/w syncope/fall c/b Rt rib pain. 87M HTN, DM2 - A1c 9.7, glaucoma, CVA, p/w syncope/fall c/b Rt rib pain.     Unwitnessed fall likely mechanical- CTH negative, CTA head and neck negative. TTE unremarkable. Tele no events. PT JASON    Musculoskeletal pain. Likely due to fall on right side and left elbow. CXR not demonstrating any overt rib fractures. CT chest: No acute fracture. Xray left elbow neg for Fx . Pain control     Type 2 diabetes mellitus- A1c 9.7    COVID- Asx, RA    Case discussed with Dr. Sorensen on 4/4, pt medically stable for discharge to rehab.

## 2023-04-01 LAB
ANION GAP SERPL CALC-SCNC: 12 MMOL/L — SIGNIFICANT CHANGE UP (ref 7–14)
BUN SERPL-MCNC: 22 MG/DL — SIGNIFICANT CHANGE UP (ref 7–23)
CALCIUM SERPL-MCNC: 10.3 MG/DL — SIGNIFICANT CHANGE UP (ref 8.4–10.5)
CHLORIDE SERPL-SCNC: 100 MMOL/L — SIGNIFICANT CHANGE UP (ref 98–107)
CO2 SERPL-SCNC: 22 MMOL/L — SIGNIFICANT CHANGE UP (ref 22–31)
CREAT SERPL-MCNC: 0.75 MG/DL — SIGNIFICANT CHANGE UP (ref 0.5–1.3)
EGFR: 88 ML/MIN/1.73M2 — SIGNIFICANT CHANGE UP
GLUCOSE SERPL-MCNC: 191 MG/DL — HIGH (ref 70–99)
HCT VFR BLD CALC: 48.2 % — SIGNIFICANT CHANGE UP (ref 39–50)
HGB BLD-MCNC: 16 G/DL — SIGNIFICANT CHANGE UP (ref 13–17)
MAGNESIUM SERPL-MCNC: 2.1 MG/DL — SIGNIFICANT CHANGE UP (ref 1.6–2.6)
MCHC RBC-ENTMCNC: 30.2 PG — SIGNIFICANT CHANGE UP (ref 27–34)
MCHC RBC-ENTMCNC: 33.2 GM/DL — SIGNIFICANT CHANGE UP (ref 32–36)
MCV RBC AUTO: 90.9 FL — SIGNIFICANT CHANGE UP (ref 80–100)
NRBC # BLD: 0 /100 WBCS — SIGNIFICANT CHANGE UP (ref 0–0)
NRBC # FLD: 0 K/UL — SIGNIFICANT CHANGE UP (ref 0–0)
PHOSPHATE SERPL-MCNC: 2.6 MG/DL — SIGNIFICANT CHANGE UP (ref 2.5–4.5)
PLATELET # BLD AUTO: 237 K/UL — SIGNIFICANT CHANGE UP (ref 150–400)
POTASSIUM SERPL-MCNC: 4.3 MMOL/L — SIGNIFICANT CHANGE UP (ref 3.5–5.3)
POTASSIUM SERPL-SCNC: 4.3 MMOL/L — SIGNIFICANT CHANGE UP (ref 3.5–5.3)
RBC # BLD: 5.3 M/UL — SIGNIFICANT CHANGE UP (ref 4.2–5.8)
RBC # FLD: 14 % — SIGNIFICANT CHANGE UP (ref 10.3–14.5)
SODIUM SERPL-SCNC: 134 MMOL/L — LOW (ref 135–145)
WBC # BLD: 5.98 K/UL — SIGNIFICANT CHANGE UP (ref 3.8–10.5)
WBC # FLD AUTO: 5.98 K/UL — SIGNIFICANT CHANGE UP (ref 3.8–10.5)

## 2023-04-01 PROCEDURE — 99232 SBSQ HOSP IP/OBS MODERATE 35: CPT

## 2023-04-01 RX ADMIN — LOSARTAN POTASSIUM 50 MILLIGRAM(S): 100 TABLET, FILM COATED ORAL at 05:39

## 2023-04-01 RX ADMIN — HEPARIN SODIUM 5000 UNIT(S): 5000 INJECTION INTRAVENOUS; SUBCUTANEOUS at 21:44

## 2023-04-01 RX ADMIN — Medication 1: at 09:26

## 2023-04-01 RX ADMIN — TRAMADOL HYDROCHLORIDE 25 MILLIGRAM(S): 50 TABLET ORAL at 05:51

## 2023-04-01 RX ADMIN — BRIMONIDINE TARTRATE 1 DROP(S): 2 SOLUTION/ DROPS OPHTHALMIC at 17:06

## 2023-04-01 RX ADMIN — LIDOCAINE 1 PATCH: 4 CREAM TOPICAL at 00:12

## 2023-04-01 RX ADMIN — TRAMADOL HYDROCHLORIDE 25 MILLIGRAM(S): 50 TABLET ORAL at 06:20

## 2023-04-01 RX ADMIN — LIDOCAINE 1 PATCH: 4 CREAM TOPICAL at 20:00

## 2023-04-01 RX ADMIN — LATANOPROST 1 DROP(S): 0.05 SOLUTION/ DROPS OPHTHALMIC; TOPICAL at 21:44

## 2023-04-01 RX ADMIN — Medication 2: at 12:55

## 2023-04-01 RX ADMIN — CHLORHEXIDINE GLUCONATE 1 APPLICATION(S): 213 SOLUTION TOPICAL at 12:58

## 2023-04-01 RX ADMIN — LIDOCAINE 1 PATCH: 4 CREAM TOPICAL at 12:55

## 2023-04-01 RX ADMIN — Medication 1: at 17:09

## 2023-04-01 RX ADMIN — AMLODIPINE BESYLATE 5 MILLIGRAM(S): 2.5 TABLET ORAL at 05:39

## 2023-04-01 RX ADMIN — BRIMONIDINE TARTRATE 1 DROP(S): 2 SOLUTION/ DROPS OPHTHALMIC at 05:39

## 2023-04-01 RX ADMIN — HEPARIN SODIUM 5000 UNIT(S): 5000 INJECTION INTRAVENOUS; SUBCUTANEOUS at 12:58

## 2023-04-01 RX ADMIN — HEPARIN SODIUM 5000 UNIT(S): 5000 INJECTION INTRAVENOUS; SUBCUTANEOUS at 05:39

## 2023-04-01 RX ADMIN — POLYETHYLENE GLYCOL 3350 17 GRAM(S): 17 POWDER, FOR SOLUTION ORAL at 05:39

## 2023-04-01 RX ADMIN — Medication 81 MILLIGRAM(S): at 12:58

## 2023-04-02 PROCEDURE — 99231 SBSQ HOSP IP/OBS SF/LOW 25: CPT

## 2023-04-02 RX ORDER — MECLIZINE HCL 12.5 MG
12.5 TABLET ORAL EVERY 8 HOURS
Refills: 0 | Status: DISCONTINUED | OUTPATIENT
Start: 2023-04-02 | End: 2023-04-04

## 2023-04-02 RX ADMIN — Medication 2: at 09:03

## 2023-04-02 RX ADMIN — Medication 81 MILLIGRAM(S): at 13:12

## 2023-04-02 RX ADMIN — Medication 2: at 13:10

## 2023-04-02 RX ADMIN — Medication 650 MILLIGRAM(S): at 13:17

## 2023-04-02 RX ADMIN — HEPARIN SODIUM 5000 UNIT(S): 5000 INJECTION INTRAVENOUS; SUBCUTANEOUS at 05:49

## 2023-04-02 RX ADMIN — BRIMONIDINE TARTRATE 1 DROP(S): 2 SOLUTION/ DROPS OPHTHALMIC at 05:48

## 2023-04-02 RX ADMIN — BRIMONIDINE TARTRATE 1 DROP(S): 2 SOLUTION/ DROPS OPHTHALMIC at 17:58

## 2023-04-02 RX ADMIN — AMLODIPINE BESYLATE 5 MILLIGRAM(S): 2.5 TABLET ORAL at 05:47

## 2023-04-02 RX ADMIN — Medication 2: at 17:59

## 2023-04-02 RX ADMIN — HEPARIN SODIUM 5000 UNIT(S): 5000 INJECTION INTRAVENOUS; SUBCUTANEOUS at 13:13

## 2023-04-02 RX ADMIN — SENNA PLUS 2 TABLET(S): 8.6 TABLET ORAL at 21:18

## 2023-04-02 RX ADMIN — HEPARIN SODIUM 5000 UNIT(S): 5000 INJECTION INTRAVENOUS; SUBCUTANEOUS at 21:18

## 2023-04-02 RX ADMIN — LOSARTAN POTASSIUM 50 MILLIGRAM(S): 100 TABLET, FILM COATED ORAL at 05:47

## 2023-04-02 RX ADMIN — Medication 650 MILLIGRAM(S): at 14:09

## 2023-04-02 RX ADMIN — CHLORHEXIDINE GLUCONATE 1 APPLICATION(S): 213 SOLUTION TOPICAL at 13:13

## 2023-04-02 RX ADMIN — LATANOPROST 1 DROP(S): 0.05 SOLUTION/ DROPS OPHTHALMIC; TOPICAL at 21:19

## 2023-04-02 NOTE — PROVIDER CONTACT NOTE (OTHER) - ASSESSMENT
A&Ox3-4; forgetftul at times. Patient states he has had dizziness in the past like the one he currently has now, However, he has it has increased in frequency. Patient was received sitting in bed. Denies c/o N/V.

## 2023-04-02 NOTE — CHART NOTE - NSCHARTNOTEFT_GEN_A_CORE
Notified by RN that patient c/o dizziness. Pt seen and examined at bedside. Pt states his dizziness happens when he changes certain positions. Orthostatics done which were negative. Likely BPPV given symptoms occur with positional changes. Started trial of PRN Meclizine. Symptoms resolved. Discussed with attending Dr. Fish.

## 2023-04-03 DIAGNOSIS — U07.1 COVID-19: ICD-10-CM

## 2023-04-03 LAB
SARS-COV-2 RNA SPEC QL NAA+PROBE: DETECTED
SARS-COV-2 RNA SPEC QL NAA+PROBE: SIGNIFICANT CHANGE UP

## 2023-04-03 PROCEDURE — 99232 SBSQ HOSP IP/OBS MODERATE 35: CPT

## 2023-04-03 RX ORDER — INSULIN GLARGINE 100 [IU]/ML
3 INJECTION, SOLUTION SUBCUTANEOUS AT BEDTIME
Refills: 0 | Status: DISCONTINUED | OUTPATIENT
Start: 2023-04-03 | End: 2023-04-04

## 2023-04-03 RX ADMIN — Medication 2: at 09:06

## 2023-04-03 RX ADMIN — HEPARIN SODIUM 5000 UNIT(S): 5000 INJECTION INTRAVENOUS; SUBCUTANEOUS at 13:19

## 2023-04-03 RX ADMIN — LIDOCAINE 1 PATCH: 4 CREAM TOPICAL at 19:34

## 2023-04-03 RX ADMIN — Medication 2: at 12:38

## 2023-04-03 RX ADMIN — HEPARIN SODIUM 5000 UNIT(S): 5000 INJECTION INTRAVENOUS; SUBCUTANEOUS at 21:24

## 2023-04-03 RX ADMIN — SENNA PLUS 2 TABLET(S): 8.6 TABLET ORAL at 21:25

## 2023-04-03 RX ADMIN — AMLODIPINE BESYLATE 5 MILLIGRAM(S): 2.5 TABLET ORAL at 05:08

## 2023-04-03 RX ADMIN — INSULIN GLARGINE 3 UNIT(S): 100 INJECTION, SOLUTION SUBCUTANEOUS at 21:25

## 2023-04-03 RX ADMIN — LATANOPROST 1 DROP(S): 0.05 SOLUTION/ DROPS OPHTHALMIC; TOPICAL at 21:26

## 2023-04-03 RX ADMIN — HEPARIN SODIUM 5000 UNIT(S): 5000 INJECTION INTRAVENOUS; SUBCUTANEOUS at 05:09

## 2023-04-03 RX ADMIN — CHLORHEXIDINE GLUCONATE 1 APPLICATION(S): 213 SOLUTION TOPICAL at 13:18

## 2023-04-03 RX ADMIN — Medication 1: at 18:07

## 2023-04-03 RX ADMIN — POLYETHYLENE GLYCOL 3350 17 GRAM(S): 17 POWDER, FOR SOLUTION ORAL at 05:09

## 2023-04-03 RX ADMIN — BRIMONIDINE TARTRATE 1 DROP(S): 2 SOLUTION/ DROPS OPHTHALMIC at 05:09

## 2023-04-03 RX ADMIN — BRIMONIDINE TARTRATE 1 DROP(S): 2 SOLUTION/ DROPS OPHTHALMIC at 17:28

## 2023-04-03 RX ADMIN — LIDOCAINE 1 PATCH: 4 CREAM TOPICAL at 13:19

## 2023-04-03 RX ADMIN — TRAMADOL HYDROCHLORIDE 25 MILLIGRAM(S): 50 TABLET ORAL at 13:18

## 2023-04-03 RX ADMIN — TRAMADOL HYDROCHLORIDE 25 MILLIGRAM(S): 50 TABLET ORAL at 14:00

## 2023-04-03 RX ADMIN — LOSARTAN POTASSIUM 50 MILLIGRAM(S): 100 TABLET, FILM COATED ORAL at 05:12

## 2023-04-03 RX ADMIN — Medication 81 MILLIGRAM(S): at 13:19

## 2023-04-04 ENCOUNTER — TRANSCRIPTION ENCOUNTER (OUTPATIENT)
Age: 88
End: 2023-04-04

## 2023-04-04 VITALS
TEMPERATURE: 98 F | HEART RATE: 74 BPM | DIASTOLIC BLOOD PRESSURE: 74 MMHG | RESPIRATION RATE: 18 BRPM | SYSTOLIC BLOOD PRESSURE: 124 MMHG | OXYGEN SATURATION: 100 %

## 2023-04-04 LAB — SARS-COV-2 RNA SPEC QL NAA+PROBE: SIGNIFICANT CHANGE UP

## 2023-04-04 PROCEDURE — 99239 HOSP IP/OBS DSCHRG MGMT >30: CPT

## 2023-04-04 RX ORDER — POLYETHYLENE GLYCOL 3350 17 G/17G
17 POWDER, FOR SOLUTION ORAL
Qty: 0 | Refills: 0 | DISCHARGE
Start: 2023-04-04

## 2023-04-04 RX ORDER — ACETAMINOPHEN 500 MG
2 TABLET ORAL
Qty: 0 | Refills: 0 | DISCHARGE
Start: 2023-04-04

## 2023-04-04 RX ORDER — MECLIZINE HCL 12.5 MG
1 TABLET ORAL
Qty: 0 | Refills: 0 | DISCHARGE
Start: 2023-04-04

## 2023-04-04 RX ORDER — TRAMADOL HYDROCHLORIDE 50 MG/1
0.5 TABLET ORAL
Qty: 0 | Refills: 0 | DISCHARGE
Start: 2023-04-04

## 2023-04-04 RX ORDER — SENNA PLUS 8.6 MG/1
2 TABLET ORAL
Qty: 0 | Refills: 0 | DISCHARGE
Start: 2023-04-04

## 2023-04-04 RX ADMIN — POLYETHYLENE GLYCOL 3350 17 GRAM(S): 17 POWDER, FOR SOLUTION ORAL at 17:54

## 2023-04-04 RX ADMIN — LIDOCAINE 1 PATCH: 4 CREAM TOPICAL at 13:42

## 2023-04-04 RX ADMIN — TRAMADOL HYDROCHLORIDE 25 MILLIGRAM(S): 50 TABLET ORAL at 04:40

## 2023-04-04 RX ADMIN — Medication 81 MILLIGRAM(S): at 13:42

## 2023-04-04 RX ADMIN — AMLODIPINE BESYLATE 5 MILLIGRAM(S): 2.5 TABLET ORAL at 05:04

## 2023-04-04 RX ADMIN — LIDOCAINE 1 PATCH: 4 CREAM TOPICAL at 00:42

## 2023-04-04 RX ADMIN — BRIMONIDINE TARTRATE 1 DROP(S): 2 SOLUTION/ DROPS OPHTHALMIC at 05:04

## 2023-04-04 RX ADMIN — HEPARIN SODIUM 5000 UNIT(S): 5000 INJECTION INTRAVENOUS; SUBCUTANEOUS at 05:03

## 2023-04-04 RX ADMIN — TRAMADOL HYDROCHLORIDE 25 MILLIGRAM(S): 50 TABLET ORAL at 03:40

## 2023-04-04 RX ADMIN — Medication 1: at 12:49

## 2023-04-04 RX ADMIN — Medication 1: at 08:53

## 2023-04-04 RX ADMIN — CHLORHEXIDINE GLUCONATE 1 APPLICATION(S): 213 SOLUTION TOPICAL at 13:42

## 2023-04-04 RX ADMIN — POLYETHYLENE GLYCOL 3350 17 GRAM(S): 17 POWDER, FOR SOLUTION ORAL at 05:04

## 2023-04-04 RX ADMIN — BRIMONIDINE TARTRATE 1 DROP(S): 2 SOLUTION/ DROPS OPHTHALMIC at 17:54

## 2023-04-04 RX ADMIN — LIDOCAINE 1 PATCH: 4 CREAM TOPICAL at 20:09

## 2023-04-04 RX ADMIN — LOSARTAN POTASSIUM 50 MILLIGRAM(S): 100 TABLET, FILM COATED ORAL at 05:03

## 2023-04-04 RX ADMIN — HEPARIN SODIUM 5000 UNIT(S): 5000 INJECTION INTRAVENOUS; SUBCUTANEOUS at 13:41

## 2023-04-04 NOTE — DIETITIAN INITIAL EVALUATION ADULT - PROBLEM SELECTOR PLAN 3
- On admission, Na 130  - Glucose on admission 345; corrected Na 134  - Will give 500 cc bolus and reassess Na with AM labs  - Trend BMP

## 2023-04-04 NOTE — PHARMACOTHERAPY INTERVENTION NOTE - COMMENTS
Discharge medications were reviewed with the patient. Patient was also educated on A1c, and healthy plate. Patient verbalized understanding. All questions and concerns were answered and addressed. Gave patient DM2 handouts (A1c and healthy plate).     New medications: Meclizine.    Fatoumata Bhatia, PharmD  Clinical Pharmacy Specialist  [facility] [id number]

## 2023-04-04 NOTE — PROGRESS NOTE ADULT - SUBJECTIVE AND OBJECTIVE BOX
[facility] Division of Hospital Medicine  Lon Sorensen MD  Pager (RADHA-F, 2M-5P): [phone]  Other Times:  [phone]    Patient is a 87y old  Male who presents with a chief complaint of Unwitnessed fall. (28 Mar 2023 16:45)    SUBJECTIVE / OVERNIGHT EVENTS:  Patient complaining of right rib pain from fall. Not relieved with tylenol. No F/C, N/V, CP, SOB, Cough, lightheadedness, dizziness, abdominal pain, diarrhea, dysuria.    MEDICATIONS  (STANDING):  amLODIPine   Tablet 5 milliGRAM(s) Oral daily  aspirin  chewable 81 milliGRAM(s) Oral daily  brimonidine 0.2% Ophthalmic Solution 1 Drop(s) Both EYES two times a day  dextrose 5%. 1000 milliLiter(s) (100 mL/Hr) IV Continuous <Continuous>  dextrose 5%. 1000 milliLiter(s) (50 mL/Hr) IV Continuous <Continuous>  dextrose 50% Injectable 25 Gram(s) IV Push once  dextrose 50% Injectable 12.5 Gram(s) IV Push once  dextrose 50% Injectable 25 Gram(s) IV Push once  glucagon  Injectable 1 milliGRAM(s) IntraMuscular once  heparin   Injectable 5000 Unit(s) SubCutaneous every 8 hours  insulin lispro (ADMELOG) corrective regimen sliding scale   SubCutaneous three times a day before meals  insulin lispro (ADMELOG) corrective regimen sliding scale   SubCutaneous at bedtime  latanoprost 0.005% Ophthalmic Solution 1 Drop(s) Both EYES at bedtime  lidocaine   4% Patch 1 Patch Transdermal daily  losartan 50 milliGRAM(s) Oral daily  potassium phosphate / sodium phosphate Powder (PHOS-NaK) 1 Packet(s) Oral three times a day with meals  senna 2 Tablet(s) Oral at bedtime  sodium chloride 0.9% Bolus 500 milliLiter(s) IV Bolus once    MEDICATIONS  (PRN):  acetaminophen     Tablet .. 650 milliGRAM(s) Oral every 6 hours PRN Temp greater or equal to 38C (100.4F), Mild Pain (1 - 3)  dextrose Oral Gel 15 Gram(s) Oral once PRN Blood Glucose LESS THAN 70 milliGRAM(s)/deciliter  polyethylene glycol 3350 17 Gram(s) Oral daily PRN Constipation  traMADol 25 milliGRAM(s) Oral every 8 hours PRN moderate to severe      Vital Signs Last 24 Hrs  T(C): 37.1 (29 Mar 2023 05:30), Max: 37.1 (28 Mar 2023 16:45)  T(F): 98.7 (29 Mar 2023 05:30), Max: 98.8 (28 Mar 2023 16:45)  HR: 83 (29 Mar 2023 05:30) (83 - 92)  BP: 139/69 (29 Mar 2023 05:30) (139/69 - 167/767)  BP(mean): 106 (28 Mar 2023 16:45) (106 - 106)  RR: 17 (29 Mar 2023 05:30) (16 - 17)  SpO2: 98% (29 Mar 2023 05:30) (98% - 100%)    Parameters below as of 29 Mar 2023 05:30  Patient On (Oxygen Delivery Method): room air      CAPILLARY BLOOD GLUCOSE      POCT Blood Glucose.: 201 mg/dL (29 Mar 2023 08:25)  POCT Blood Glucose.: 266 mg/dL (28 Mar 2023 22:46)  POCT Blood Glucose.: 423 mg/dL (28 Mar 2023 21:12)    I&O's Summary      PHYSICAL EXAM:  CONSTITUTIONAL: NAD, well-developed, well-groomed  EYES: PERRLA; conjunctiva and sclera clear  ENMT: Moist oral mucosa, no pharyngeal injection or exudates; normal dentition  NECK: Supple, no palpable masses; no thyromegaly  RESPIRATORY: Normal respiratory effort; lungs are clear to auscultation bilaterally  CARDIOVASCULAR: Regular rate and rhythm, normal S1 and S2, no murmur/rub/gallop; No lower extremity edema; Peripheral pulses are 2+ bilaterally  ABDOMEN: Nontender to palpation, normoactive bowel sounds, no rebound/guarding; No hepatosplenomegaly  MUSCULOSKELETAL:  Did not assess gait; no clubbing or cyanosis of digits; no joint swelling or tenderness to palpation; tenderness to palpation over right rib.  PSYCH: A+O to person, place, and time; affect appropriate  NEUROLOGY: CN 2-12 are intact and symmetric; no gross sensory deficits   SKIN: No rashes; no palpable lesions    LABS:                        15.6   9.49  )-----------( 293      ( 28 Mar 2023 11:41 )             46.8     03-29    137  |  102  |  18  ----------------------------<  210<H>  3.6   |  21<L>  |  0.75    Ca    9.7      29 Mar 2023 05:35  Phos  2.0     03-29  Mg     2.00     03-29    TPro  7.8  /  Alb  4.4  /  TBili  0.9  /  DBili  x   /  AST  28  /  ALT  23  /  AlkPhos  115  03-28    PT/INR - ( 28 Mar 2023 11:41 )   PT: 12.1 sec;   INR: 1.04 ratio                   RADIOLOGY & ADDITIONAL TESTS:  < from: Xray Elbow AP + Lateral, Left (03.29.23 @ 12:12) >  FINDINGS:    No fractures are seen.    Joint spaces are maintained. There is no displacement of the elbow fat   pads.    Enthesophytes or dystrophic ossifications/calcifications are present   along both epicondyles and the radial margin of the radialneck.    IMPRESSION:    No fractures are appreciated.    < end of copied text >    < from: Transthoracic Echocardiogram (03.29.23 @ 07:10) >  ------------------------------------------------------------------------  CONCLUSIONS:  1. Normal trileaflet aortic valve. Minimal aortic  regurgitation.  2. Normal left ventricular internal dimensions and wall  thicknesses.  3. Normal left ventricular systolic function. No segmental  wall motion abnormalities. Paradoxical septal wall motion  4. Normal right ventricular size and function.  ------------------------------------------------------------------------  Confirmed on  3/29/2023 - 13:16:52 by SHARA Tolbert  ------------------------------------------------------------------------    < end of copied text >    Imaging Personally Reviewed:    Care Discussed with Consultants/Other Providers:  
[facility] Division of Hospital Medicine  Lon Sorensen MD  Pager (RADHA-JAVAD, 8A-5P): [phone]  Other Times:  [phone]    Patient is a 87y old  Male who presents with a chief complaint of Unwitnessed fall. (29 Mar 2023 14:26)    SUBJECTIVE / OVERNIGHT EVENTS:  Patient still complaining of pain but has not asked for Ultram for use.  Reinforced the importance of utilizing pain meds for his MSK pains. No F/C, N/V, CP, SOB, Cough, lightheadedness, dizziness, abdominal pain, diarrhea, dysuria.    MEDICATIONS  (STANDING):  amLODIPine   Tablet 5 milliGRAM(s) Oral daily  aspirin  chewable 81 milliGRAM(s) Oral daily  brimonidine 0.2% Ophthalmic Solution 1 Drop(s) Both EYES two times a day  chlorhexidine 2% Cloths 1 Application(s) Topical daily  dextrose 5%. 1000 milliLiter(s) (100 mL/Hr) IV Continuous <Continuous>  dextrose 5%. 1000 milliLiter(s) (50 mL/Hr) IV Continuous <Continuous>  dextrose 50% Injectable 25 Gram(s) IV Push once  dextrose 50% Injectable 12.5 Gram(s) IV Push once  dextrose 50% Injectable 25 Gram(s) IV Push once  glucagon  Injectable 1 milliGRAM(s) IntraMuscular once  heparin   Injectable 5000 Unit(s) SubCutaneous every 8 hours  influenza  Vaccine (HIGH DOSE) 0.7 milliLiter(s) IntraMuscular once  insulin lispro (ADMELOG) corrective regimen sliding scale   SubCutaneous three times a day before meals  insulin lispro (ADMELOG) corrective regimen sliding scale   SubCutaneous at bedtime  latanoprost 0.005% Ophthalmic Solution 1 Drop(s) Both EYES at bedtime  lidocaine   4% Patch 1 Patch Transdermal daily  losartan 50 milliGRAM(s) Oral daily  polyethylene glycol 3350 17 Gram(s) Oral two times a day  senna 2 Tablet(s) Oral at bedtime  sodium chloride 0.9% Bolus 500 milliLiter(s) IV Bolus once    MEDICATIONS  (PRN):  acetaminophen     Tablet .. 650 milliGRAM(s) Oral every 6 hours PRN Temp greater or equal to 38C (100.4F), Mild Pain (1 - 3)  dextrose Oral Gel 15 Gram(s) Oral once PRN Blood Glucose LESS THAN 70 milliGRAM(s)/deciliter  traMADol 25 milliGRAM(s) Oral every 8 hours PRN moderate to severe      Vital Signs Last 24 Hrs  T(C): 36.7 (30 Mar 2023 05:00), Max: 36.7 (30 Mar 2023 05:00)  T(F): 98.1 (30 Mar 2023 05:00), Max: 98.1 (30 Mar 2023 05:00)  HR: 70 (30 Mar 2023 05:00) (64 - 70)  BP: 11/78 (30 Mar 2023 05:00) (11/78 - 122/63)  BP(mean): --  RR: 17 (30 Mar 2023 05:00) (17 - 19)  SpO2: 97% (30 Mar 2023 05:00) (97% - 97%)    Parameters below as of 30 Mar 2023 05:00  Patient On (Oxygen Delivery Method): room air      CAPILLARY BLOOD GLUCOSE      POCT Blood Glucose.: 200 mg/dL (30 Mar 2023 12:36)  POCT Blood Glucose.: 188 mg/dL (30 Mar 2023 08:44)  POCT Blood Glucose.: 176 mg/dL (29 Mar 2023 21:16)  POCT Blood Glucose.: 202 mg/dL (29 Mar 2023 17:44)    I&O's Summary    29 Mar 2023 07:01  -  30 Mar 2023 07:00  --------------------------------------------------------  IN: 100 mL / OUT: 600 mL / NET: -500 mL        PHYSICAL EXAM:  CONSTITUTIONAL: NAD, well-developed, well-groomed  EYES: PERRLA; conjunctiva and sclera clear  ENMT: Moist oral mucosa, no pharyngeal injection or exudates; normal dentition  NECK: Supple, no palpable masses; no thyromegaly  RESPIRATORY: Normal respiratory effort; lungs are clear to auscultation bilaterally  CARDIOVASCULAR: Regular rate and rhythm, normal S1 and S2, no murmur/rub/gallop; No lower extremity edema; Peripheral pulses are 2+ bilaterally  ABDOMEN: Nontender to palpation, normoactive bowel sounds, no rebound/guarding; No hepatosplenomegaly  MUSCULOSKELETAL:  Did not assess gait; no clubbing or cyanosis of digits; no joint swelling or tenderness to palpation; tenderness to palpation over right rib.  PSYCH: A+O to person, place, and time; affect appropriate  NEUROLOGY: CN 2-12 are intact and symmetric; no gross sensory deficits   SKIN: No rashes; no palpable lesions    LABS:                        14.0   8.54  )-----------( 224      ( 30 Mar 2023 06:00 )             42.1     03-30    136  |  102  |  18  ----------------------------<  220<H>  4.1   |  23  |  0.75    Ca    9.7      30 Mar 2023 06:00  Phos  2.7     03-30  Mg     1.90     03-30                RADIOLOGY & ADDITIONAL TESTS:    Imaging Personally Reviewed:    Care Discussed with Consultants/Other Providers:  
[facility] Division of Hospital Medicine  Lon Sorensen MD  Pager (RADHA-F, 7V-5P): [phone]  Other Times:  [phone]    Patient is a 87y old  Male who presents with a chief complaint of Unwitnessed fall. (30 Mar 2023 13:01)    SUBJECTIVE / OVERNIGHT EVENTS:  Pain slightly better with toradol.  Going down for Rib CT at this time. No F/C, N/V, CP, SOB, Cough, lightheadedness, dizziness, abdominal pain, diarrhea, dysuria.    MEDICATIONS  (STANDING):  amLODIPine   Tablet 5 milliGRAM(s) Oral daily  aspirin  chewable 81 milliGRAM(s) Oral daily  brimonidine 0.2% Ophthalmic Solution 1 Drop(s) Both EYES two times a day  chlorhexidine 2% Cloths 1 Application(s) Topical daily  dextrose 5%. 1000 milliLiter(s) (100 mL/Hr) IV Continuous <Continuous>  dextrose 5%. 1000 milliLiter(s) (50 mL/Hr) IV Continuous <Continuous>  dextrose 50% Injectable 25 Gram(s) IV Push once  dextrose 50% Injectable 12.5 Gram(s) IV Push once  dextrose 50% Injectable 25 Gram(s) IV Push once  glucagon  Injectable 1 milliGRAM(s) IntraMuscular once  heparin   Injectable 5000 Unit(s) SubCutaneous every 8 hours  influenza  Vaccine (HIGH DOSE) 0.7 milliLiter(s) IntraMuscular once  insulin lispro (ADMELOG) corrective regimen sliding scale   SubCutaneous three times a day before meals  insulin lispro (ADMELOG) corrective regimen sliding scale   SubCutaneous at bedtime  latanoprost 0.005% Ophthalmic Solution 1 Drop(s) Both EYES at bedtime  lidocaine   4% Patch 1 Patch Transdermal daily  losartan 50 milliGRAM(s) Oral daily  polyethylene glycol 3350 17 Gram(s) Oral two times a day  senna 2 Tablet(s) Oral at bedtime  sodium chloride 0.9% Bolus 500 milliLiter(s) IV Bolus once    MEDICATIONS  (PRN):  acetaminophen     Tablet .. 650 milliGRAM(s) Oral every 6 hours PRN Temp greater or equal to 38C (100.4F), Mild Pain (1 - 3)  dextrose Oral Gel 15 Gram(s) Oral once PRN Blood Glucose LESS THAN 70 milliGRAM(s)/deciliter  traMADol 25 milliGRAM(s) Oral every 8 hours PRN moderate to severe      Vital Signs Last 24 Hrs  T(C): 36.6 (31 Mar 2023 05:15), Max: 36.6 (31 Mar 2023 05:15)  T(F): 97.9 (31 Mar 2023 05:15), Max: 97.9 (31 Mar 2023 05:15)  HR: 66 (31 Mar 2023 05:15) (66 - 76)  BP: 136/67 (31 Mar 2023 05:15) (127/72 - 141/67)  BP(mean): --  RR: 17 (31 Mar 2023 05:15) (16 - 17)  SpO2: 97% (31 Mar 2023 05:15) (97% - 98%)    Parameters below as of 31 Mar 2023 05:15  Patient On (Oxygen Delivery Method): room air      CAPILLARY BLOOD GLUCOSE      POCT Blood Glucose.: 167 mg/dL (31 Mar 2023 09:04)  POCT Blood Glucose.: 184 mg/dL (30 Mar 2023 21:21)  POCT Blood Glucose.: 174 mg/dL (30 Mar 2023 17:47)  POCT Blood Glucose.: 200 mg/dL (30 Mar 2023 12:36)    I&O's Summary      PHYSICAL EXAM:  CONSTITUTIONAL: NAD, well-developed, well-groomed  EYES: PERRLA; conjunctiva and sclera clear  ENMT: Moist oral mucosa, no pharyngeal injection or exudates; normal dentition  NECK: Supple, no palpable masses; no thyromegaly  RESPIRATORY: Normal respiratory effort; lungs are clear to auscultation bilaterally  CARDIOVASCULAR: Regular rate and rhythm, normal S1 and S2, no murmur/rub/gallop; No lower extremity edema; Peripheral pulses are 2+ bilaterally  ABDOMEN: Nontender to palpation, normoactive bowel sounds, no rebound/guarding; No hepatosplenomegaly  MUSCULOSKELETAL:  Did not assess gait; no clubbing or cyanosis of digits; no joint swelling or tenderness to palpation; tenderness to palpation over right rib.  PSYCH: A+O to person, place, and time; affect appropriate  NEUROLOGY: CN 2-12 are intact and symmetric; no gross sensory deficits   SKIN: No rashes; no palpable lesions    LABS:                        14.2   7.12  )-----------( 218      ( 31 Mar 2023 06:08 )             43.0     03-31    133<L>  |  101  |  26<H>  ----------------------------<  204<H>  4.3   |  23  |  0.81    Ca    9.8      31 Mar 2023 06:08  Phos  3.1     03-31  Mg     2.00     03-31                RADIOLOGY & ADDITIONAL TESTS:    Imaging Personally Reviewed:    Care Discussed with Consultants/Other Providers:  
Elisabeth Fish MD  [facility] Division of Hospital Medicine  Pager: [phone]  Available via MS Teams    SUBJECTIVE / OVERNIGHT EVENTS:    Continues to have pain on the side he fell. Improving.     MEDICATIONS  (STANDING):  amLODIPine   Tablet 5 milliGRAM(s) Oral daily  aspirin  chewable 81 milliGRAM(s) Oral daily  brimonidine 0.2% Ophthalmic Solution 1 Drop(s) Both EYES two times a day  chlorhexidine 2% Cloths 1 Application(s) Topical daily  dextrose 5%. 1000 milliLiter(s) (100 mL/Hr) IV Continuous <Continuous>  dextrose 5%. 1000 milliLiter(s) (50 mL/Hr) IV Continuous <Continuous>  dextrose 50% Injectable 25 Gram(s) IV Push once  dextrose 50% Injectable 12.5 Gram(s) IV Push once  dextrose 50% Injectable 25 Gram(s) IV Push once  glucagon  Injectable 1 milliGRAM(s) IntraMuscular once  heparin   Injectable 5000 Unit(s) SubCutaneous every 8 hours  influenza  Vaccine (HIGH DOSE) 0.7 milliLiter(s) IntraMuscular once  insulin lispro (ADMELOG) corrective regimen sliding scale   SubCutaneous three times a day before meals  insulin lispro (ADMELOG) corrective regimen sliding scale   SubCutaneous at bedtime  latanoprost 0.005% Ophthalmic Solution 1 Drop(s) Both EYES at bedtime  lidocaine   4% Patch 1 Patch Transdermal daily  losartan 50 milliGRAM(s) Oral daily  polyethylene glycol 3350 17 Gram(s) Oral two times a day  senna 2 Tablet(s) Oral at bedtime  sodium chloride 0.9% Bolus 500 milliLiter(s) IV Bolus once    MEDICATIONS  (PRN):  acetaminophen     Tablet .. 650 milliGRAM(s) Oral every 6 hours PRN Temp greater or equal to 38C (100.4F), Mild Pain (1 - 3)  dextrose Oral Gel 15 Gram(s) Oral once PRN Blood Glucose LESS THAN 70 milliGRAM(s)/deciliter  traMADol 25 milliGRAM(s) Oral every 8 hours PRN moderate to severe      I&O's Summary      PHYSICAL EXAM:  Vital Signs Last 24 Hrs  T(C): 36.7 (02 Apr 2023 12:08), Max: 36.7 (01 Apr 2023 16:30)  T(F): 98 (02 Apr 2023 12:08), Max: 98.1 (01 Apr 2023 16:30)  HR: 67 (02 Apr 2023 12:08) (66 - 72)  BP: 90/53 (02 Apr 2023 12:08) (90/53 - 128/62)  BP(mean): --  RR: 20 (02 Apr 2023 12:08) (18 - 20)  SpO2: 97% (02 Apr 2023 12:08) (97% - 98%)    Parameters below as of 02 Apr 2023 12:08  Patient On (Oxygen Delivery Method): room air      CONSTITUTIONAL: NAD, well-developed   EYES: conjunctiva and sclera clear  ENMT: Moist oral mucosa   NECK: Supple   RESPIRATORY: Normal respiratory effort; lungs are clear to auscultation bilaterally  CARDIOVASCULAR: Regular rate and rhythm, normal S1 and S2, no murmur/rub/gallop; Peripheral pulses are 2+ bilaterally  ABDOMEN: Nontender to palpation, normoactive bowel sounds, no rebound/guarding   MUSCULOSKELETAL: No lower extremity edema   PSYCH: A+O to person, place, and time; affect appropriate  NEUROLOGY: no gross sensory or motor deficits   SKIN: No rashes    LABS:                        16.0   5.98  )-----------( 237      ( 01 Apr 2023 05:28 )             48.2     04-01    134<L>  |  100  |  22  ----------------------------<  191<H>  4.3   |  22  |  0.75    Ca    10.3      01 Apr 2023 05:28  Phos  2.6     04-01  Mg     2.10     04-01                SARS-CoV-2: Nathanael (28 Mar 2023 12:40)    COORDINATION OF CARE:  Communication: discussed plan of care with ACP 
Elisabeth Fish MD  [facility] Division of Hospital Medicine  Pager: [phone]  Available via MS Teams    SUBJECTIVE / OVERNIGHT EVENTS:    Continues to have right sided pain post fall.   Improves with pain meds     MEDICATIONS  (STANDING):  amLODIPine   Tablet 5 milliGRAM(s) Oral daily  aspirin  chewable 81 milliGRAM(s) Oral daily  brimonidine 0.2% Ophthalmic Solution 1 Drop(s) Both EYES two times a day  chlorhexidine 2% Cloths 1 Application(s) Topical daily  dextrose 5%. 1000 milliLiter(s) (100 mL/Hr) IV Continuous <Continuous>  dextrose 5%. 1000 milliLiter(s) (50 mL/Hr) IV Continuous <Continuous>  dextrose 50% Injectable 25 Gram(s) IV Push once  dextrose 50% Injectable 12.5 Gram(s) IV Push once  dextrose 50% Injectable 25 Gram(s) IV Push once  glucagon  Injectable 1 milliGRAM(s) IntraMuscular once  heparin   Injectable 5000 Unit(s) SubCutaneous every 8 hours  influenza  Vaccine (HIGH DOSE) 0.7 milliLiter(s) IntraMuscular once  insulin lispro (ADMELOG) corrective regimen sliding scale   SubCutaneous three times a day before meals  insulin lispro (ADMELOG) corrective regimen sliding scale   SubCutaneous at bedtime  latanoprost 0.005% Ophthalmic Solution 1 Drop(s) Both EYES at bedtime  lidocaine   4% Patch 1 Patch Transdermal daily  losartan 50 milliGRAM(s) Oral daily  polyethylene glycol 3350 17 Gram(s) Oral two times a day  senna 2 Tablet(s) Oral at bedtime  sodium chloride 0.9% Bolus 500 milliLiter(s) IV Bolus once    MEDICATIONS  (PRN):  acetaminophen     Tablet .. 650 milliGRAM(s) Oral every 6 hours PRN Temp greater or equal to 38C (100.4F), Mild Pain (1 - 3)  dextrose Oral Gel 15 Gram(s) Oral once PRN Blood Glucose LESS THAN 70 milliGRAM(s)/deciliter  traMADol 25 milliGRAM(s) Oral every 8 hours PRN moderate to severe      I&O's Summary      PHYSICAL EXAM:  Vital Signs Last 24 Hrs  T(C): 36.6 (01 Apr 2023 12:30), Max: 37.1 (01 Apr 2023 09:14)  T(F): 97.8 (01 Apr 2023 12:30), Max: 98.8 (01 Apr 2023 09:14)  HR: 72 (01 Apr 2023 12:30) (65 - 79)  BP: 116/56 (01 Apr 2023 12:30) (116/56 - 155/82)  BP(mean): --  RR: 20 (01 Apr 2023 12:30) (18 - 20)  SpO2: 97% (01 Apr 2023 12:30) (97% - 99%)    Parameters below as of 01 Apr 2023 12:30  Patient On (Oxygen Delivery Method): room air      CONSTITUTIONAL: NAD, well-developed   EYES: conjunctiva and sclera clear  ENMT: Moist oral mucosa   NECK: Supple   RESPIRATORY: Normal respiratory effort; lungs are clear to auscultation bilaterally  CARDIOVASCULAR: Regular rate and rhythm, normal S1 and S2, no murmur/rub/gallop; Peripheral pulses are 2+ bilaterally  ABDOMEN: Nontender to palpation, normoactive bowel sounds, no rebound/guarding   MUSCULOSKELETAL: No lower extremity edema   PSYCH: A+O to person, place, and time; affect appropriate  NEUROLOGY: no gross sensory or motor deficits   SKIN: No rashes    LABS:                        16.0   5.98  )-----------( 237      ( 01 Apr 2023 05:28 )             48.2     04-01    134<L>  |  100  |  22  ----------------------------<  191<H>  4.3   |  22  |  0.75    Ca    10.3      01 Apr 2023 05:28  Phos  2.6     04-01  Mg     2.10     04-01                SARS-CoV-2: NotDetec (28 Mar 2023 12:40)      RADIOLOGY & ADDITIONAL TESTS:  Other Results Reviewed Today: BMP with stable Cr, CBC with stable Hg     COORDINATION OF CARE:  Communication: discussed plan of care with ACP 
[facility] Division of Hospital Medicine  Lon Sorensen MD  Pager (RADHA-F, 2W-5P): [phone]  Other Times:  [phone]    Patient is a 87y old  Male who presents with a chief complaint of Unwitnessed fall. (02 Apr 2023 12:15)    SUBJECTIVE / OVERNIGHT EVENTS:  Patient offers no new complaints.  Incidentally found to have positive COVID. Asymptomatic. Still complaining of Rt rib pain. No F/C, N/V, SOB, Cough, lightheadedness, dizziness, abdominal pain, diarrhea, dysuria.    MEDICATIONS  (STANDING):  amLODIPine   Tablet 5 milliGRAM(s) Oral daily  aspirin  chewable 81 milliGRAM(s) Oral daily  brimonidine 0.2% Ophthalmic Solution 1 Drop(s) Both EYES two times a day  chlorhexidine 2% Cloths 1 Application(s) Topical daily  dextrose 5%. 1000 milliLiter(s) (100 mL/Hr) IV Continuous <Continuous>  dextrose 5%. 1000 milliLiter(s) (50 mL/Hr) IV Continuous <Continuous>  dextrose 50% Injectable 25 Gram(s) IV Push once  dextrose 50% Injectable 12.5 Gram(s) IV Push once  dextrose 50% Injectable 25 Gram(s) IV Push once  glucagon  Injectable 1 milliGRAM(s) IntraMuscular once  heparin   Injectable 5000 Unit(s) SubCutaneous every 8 hours  influenza  Vaccine (HIGH DOSE) 0.7 milliLiter(s) IntraMuscular once  insulin glargine Injectable (LANTUS) 3 Unit(s) SubCutaneous at bedtime  insulin lispro (ADMELOG) corrective regimen sliding scale   SubCutaneous three times a day before meals  insulin lispro (ADMELOG) corrective regimen sliding scale   SubCutaneous at bedtime  latanoprost 0.005% Ophthalmic Solution 1 Drop(s) Both EYES at bedtime  lidocaine   4% Patch 1 Patch Transdermal daily  losartan 50 milliGRAM(s) Oral daily  polyethylene glycol 3350 17 Gram(s) Oral two times a day  senna 2 Tablet(s) Oral at bedtime  sodium chloride 0.9% Bolus 500 milliLiter(s) IV Bolus once    MEDICATIONS  (PRN):  acetaminophen     Tablet .. 650 milliGRAM(s) Oral every 6 hours PRN Temp greater or equal to 38C (100.4F), Mild Pain (1 - 3)  dextrose Oral Gel 15 Gram(s) Oral once PRN Blood Glucose LESS THAN 70 milliGRAM(s)/deciliter  meclizine 12.5 milliGRAM(s) Oral every 8 hours PRN Dizziness  traMADol 25 milliGRAM(s) Oral every 8 hours PRN moderate to severe      Vital Signs Last 24 Hrs  T(C): 36.7 (03 Apr 2023 12:00), Max: 36.8 (02 Apr 2023 15:20)  T(F): 98 (03 Apr 2023 12:00), Max: 98.3 (02 Apr 2023 15:20)  HR: 76 (03 Apr 2023 12:00) (62 - 76)  BP: 115/55 (03 Apr 2023 12:00) (102/- - 115/55)  BP(mean): --  RR: 18 (03 Apr 2023 12:00) (18 - 20)  SpO2: 96% (03 Apr 2023 08:25) (96% - 97%)    Parameters below as of 03 Apr 2023 12:00  Patient On (Oxygen Delivery Method): room air      CAPILLARY BLOOD GLUCOSE      POCT Blood Glucose.: 250 mg/dL (03 Apr 2023 12:26)  POCT Blood Glucose.: 207 mg/dL (03 Apr 2023 08:43)  POCT Blood Glucose.: 158 mg/dL (02 Apr 2023 21:25)  POCT Blood Glucose.: 209 mg/dL (02 Apr 2023 17:24)    I&O's Summary      PHYSICAL EXAM:  CONSTITUTIONAL: NAD, well-developed   EYES: conjunctiva and sclera clear  ENMT: Moist oral mucosa   NECK: Supple   RESPIRATORY: Normal respiratory effort; lungs are clear to auscultation bilaterally  CARDIOVASCULAR: Regular rate and rhythm, normal S1 and S2, no murmur/rub/gallop; Peripheral pulses are 2+ bilaterally  ABDOMEN: Nontender to palpation, normoactive bowel sounds, no rebound/guarding   MUSCULOSKELETAL: No lower extremity edema   PSYCH: A+O to person, place, and time; affect appropriate  NEUROLOGY: no gross sensory or motor deficits   SKIN: No rashes    LABS:                    RADIOLOGY & ADDITIONAL TESTS:    Imaging Personally Reviewed:    Care Discussed with Consultants/Other Providers:  
[facility] Division of Hospital Medicine  Lon Sorensen MD  Pager (M-F, 5M-5P): [phone]  Other Times:  [phone]    Patient is a 87y old  Male who presents with a chief complaint of Unwitnessed fall. (03 Apr 2023 15:13)    SUBJECTIVE / OVERNIGHT EVENTS:  No new complaints. No F/C, N/V, CP, SOB, Cough, lightheadedness, dizziness, abdominal pain, diarrhea, dysuria.    MEDICATIONS  (STANDING):  amLODIPine   Tablet 5 milliGRAM(s) Oral daily  aspirin  chewable 81 milliGRAM(s) Oral daily  brimonidine 0.2% Ophthalmic Solution 1 Drop(s) Both EYES two times a day  chlorhexidine 2% Cloths 1 Application(s) Topical daily  dextrose 5%. 1000 milliLiter(s) (100 mL/Hr) IV Continuous <Continuous>  dextrose 5%. 1000 milliLiter(s) (50 mL/Hr) IV Continuous <Continuous>  dextrose 50% Injectable 25 Gram(s) IV Push once  dextrose 50% Injectable 12.5 Gram(s) IV Push once  dextrose 50% Injectable 25 Gram(s) IV Push once  glucagon  Injectable 1 milliGRAM(s) IntraMuscular once  heparin   Injectable 5000 Unit(s) SubCutaneous every 8 hours  influenza  Vaccine (HIGH DOSE) 0.7 milliLiter(s) IntraMuscular once  insulin glargine Injectable (LANTUS) 3 Unit(s) SubCutaneous at bedtime  insulin lispro (ADMELOG) corrective regimen sliding scale   SubCutaneous three times a day before meals  insulin lispro (ADMELOG) corrective regimen sliding scale   SubCutaneous at bedtime  latanoprost 0.005% Ophthalmic Solution 1 Drop(s) Both EYES at bedtime  lidocaine   4% Patch 1 Patch Transdermal daily  losartan 50 milliGRAM(s) Oral daily  polyethylene glycol 3350 17 Gram(s) Oral two times a day  senna 2 Tablet(s) Oral at bedtime  sodium chloride 0.9% Bolus 500 milliLiter(s) IV Bolus once    MEDICATIONS  (PRN):  acetaminophen     Tablet .. 650 milliGRAM(s) Oral every 6 hours PRN Temp greater or equal to 38C (100.4F), Mild Pain (1 - 3)  dextrose Oral Gel 15 Gram(s) Oral once PRN Blood Glucose LESS THAN 70 milliGRAM(s)/deciliter  meclizine 12.5 milliGRAM(s) Oral every 8 hours PRN Dizziness  traMADol 25 milliGRAM(s) Oral every 8 hours PRN moderate to severe      Vital Signs Last 24 Hrs  T(C): 36.8 (04 Apr 2023 08:55), Max: 37 (03 Apr 2023 17:15)  T(F): 98.3 (04 Apr 2023 08:55), Max: 98.6 (03 Apr 2023 17:15)  HR: 82 (04 Apr 2023 08:55) (70 - 82)  BP: 121/73 (04 Apr 2023 08:55) (114/60 - 148/73)  BP(mean): --  RR: 18 (04 Apr 2023 08:55) (18 - 18)  SpO2: 97% (04 Apr 2023 08:55) (97% - 98%)    Parameters below as of 04 Apr 2023 08:55  Patient On (Oxygen Delivery Method): room air      CAPILLARY BLOOD GLUCOSE      POCT Blood Glucose.: 189 mg/dL (04 Apr 2023 12:23)  POCT Blood Glucose.: 176 mg/dL (04 Apr 2023 08:33)  POCT Blood Glucose.: 246 mg/dL (03 Apr 2023 21:10)  POCT Blood Glucose.: 169 mg/dL (03 Apr 2023 17:33)    I&O's Summary      PHYSICAL EXAM:  CONSTITUTIONAL: NAD, well-developed   EYES: conjunctiva and sclera clear  ENMT: Moist oral mucosa   NECK: Supple   RESPIRATORY: Normal respiratory effort; lungs are clear to auscultation bilaterally  CARDIOVASCULAR: Regular rate and rhythm, normal S1 and S2, no murmur/rub/gallop; Peripheral pulses are 2+ bilaterally  ABDOMEN: Nontender to palpation, normoactive bowel sounds, no rebound/guarding   MUSCULOSKELETAL: No lower extremity edema   PSYCH: A+O to person, place, and time; affect appropriate  NEUROLOGY: no gross sensory or motor deficits   SKIN: No rashes    LABS:                    RADIOLOGY & ADDITIONAL TESTS:    Imaging Personally Reviewed:    Care Discussed with Consultants/Other Providers:

## 2023-04-04 NOTE — DIETITIAN INITIAL EVALUATION ADULT - PROBLEM SELECTOR PLAN 9
- Hx of stroke, no focal deficits noted on examination  - CT brain scans negative for evidence of new stroke  - C/w aspirin, not on statin per family  - Lipid panel in AM  - PT evaluation pending

## 2023-04-04 NOTE — DIETITIAN INITIAL EVALUATION ADULT - PERTINENT MEDS FT
MEDICATIONS  (STANDING):  amLODIPine   Tablet 5 milliGRAM(s) Oral daily  aspirin  chewable 81 milliGRAM(s) Oral daily  brimonidine 0.2% Ophthalmic Solution 1 Drop(s) Both EYES two times a day  chlorhexidine 2% Cloths 1 Application(s) Topical daily  dextrose 5%. 1000 milliLiter(s) (50 mL/Hr) IV Continuous <Continuous>  dextrose 5%. 1000 milliLiter(s) (100 mL/Hr) IV Continuous <Continuous>  dextrose 50% Injectable 25 Gram(s) IV Push once  dextrose 50% Injectable 12.5 Gram(s) IV Push once  dextrose 50% Injectable 25 Gram(s) IV Push once  glucagon  Injectable 1 milliGRAM(s) IntraMuscular once  heparin   Injectable 5000 Unit(s) SubCutaneous every 8 hours  influenza  Vaccine (HIGH DOSE) 0.7 milliLiter(s) IntraMuscular once  insulin glargine Injectable (LANTUS) 3 Unit(s) SubCutaneous at bedtime  insulin lispro (ADMELOG) corrective regimen sliding scale   SubCutaneous three times a day before meals  insulin lispro (ADMELOG) corrective regimen sliding scale   SubCutaneous at bedtime  latanoprost 0.005% Ophthalmic Solution 1 Drop(s) Both EYES at bedtime  lidocaine   4% Patch 1 Patch Transdermal daily  losartan 50 milliGRAM(s) Oral daily  polyethylene glycol 3350 17 Gram(s) Oral two times a day  senna 2 Tablet(s) Oral at bedtime  sodium chloride 0.9% Bolus 500 milliLiter(s) IV Bolus once    MEDICATIONS  (PRN):  acetaminophen     Tablet .. 650 milliGRAM(s) Oral every 6 hours PRN Temp greater or equal to 38C (100.4F), Mild Pain (1 - 3)  dextrose Oral Gel 15 Gram(s) Oral once PRN Blood Glucose LESS THAN 70 milliGRAM(s)/deciliter  meclizine 12.5 milliGRAM(s) Oral every 8 hours PRN Dizziness  traMADol 25 milliGRAM(s) Oral every 8 hours PRN moderate to severe

## 2023-04-04 NOTE — PROGRESS NOTE ADULT - ASSESSMENT
87M HTN, DM2 - A1c 9.7, glaucoma, CVA, p/w syncope/fall c/b Rt rib pain.
87M HTN, DM2 - A1c 9.7, glaucoma, CVA, p/w syncope/fall c/b Rt rib pain.
87M HTN, DM2 - A1c 9.7, glaucoma, CVA, p/w syncope/fall.
87M HTN, DM2 - A1c 9.7, glaucoma, CVA, p/w syncope/fall.
87M HTN, DM2 - A1c 9.7, glaucoma, CVA, p/w syncope/fall c/b Rt rib pain, COVID positive status but suspicious for false positive.
87M HTN, DM2 - A1c 9.7, glaucoma, CVA, p/w syncope/fall c/b Rt rib pain.
87M HTN, DM2 - A1c 9.7, glaucoma, CVA, p/w syncope/fall c/b Rt rib pain, COVID positive status.

## 2023-04-04 NOTE — DISCHARGE NOTE NURSING/CASE MANAGEMENT/SOCIAL WORK - PATIENT PORTAL LINK FT
You can access the FollowMyHealth Patient Portal offered by [facility] by registering at the following website: [email or web address]. By joining [facility] FollowMyHealth portal, you will also be able to view your health information using other applications (apps) compatible with our system.

## 2023-04-04 NOTE — PROGRESS NOTE ADULT - PROBLEM SELECTOR PLAN 1
suspicious for syncope but likely mechanical fall  - TTE reviewed - nml LVFx  - Tele monitor - no events noted  - PT eval - JASON once medically optimized.
suspicious for syncope but likely mechanical fall  - TTE reviewed - nml LVFx  - Tele monitor - no events noted - D/C tele  - PT eval - JASON once medically optimized.
suspicious for syncope but likely mechanical fall  - TTE reviewed - nml LVFx  - Tele monitor - no events noted  - PT eval for safe disposition
suspicious for syncope but likely mechanical fall  - TTE reviewed - nml LVFx  - Tele monitor - no events noted  - PT eval - JASON once medically optimized.
suspicious for syncope but likely mechanical fall  - TTE reviewed - nml LVFx  - Tele monitor - no events noted  - PT eval for safe disposition
suspicious for syncope but likely mechanical fall  - TTE reviewed - nml LVFx  - Tele monitor - no events noted  - PT eval - JASON once medically optimized.
suspicious for syncope but likely mechanical fall  - TTE reviewed - nml LVFx  - Tele monitor - no events noted - D/C tele  - PT eval - JASON once medically optimized.

## 2023-04-04 NOTE — PROGRESS NOTE ADULT - PROBLEM SELECTOR PLAN 6
Asymptomatic at this time  - repeat COVID test on 4/3 was negative  - awaiting repeat COVID from 4/4 AM  - could be false positive result.
Asymptomatic at this time  - monitor for clinical signs.

## 2023-04-04 NOTE — PROGRESS NOTE ADULT - PROBLEM SELECTOR PLAN 2
RESOLVED  - monitor BMP

## 2023-04-04 NOTE — DIETITIAN INITIAL EVALUATION ADULT - NS FNS DIET ORDER
Diet, DASH/TLC:   Sodium & Cholesterol Restricted  Consistent Carbohydrate {Evening Snack} (CSTCHOSN)  Supplement Feeding Modality:  Oral  Glucerna Shake Cans or Servings Per Day:  1       Frequency:  Two Times a day (03-31-23 @ 15:34) [Active]

## 2023-04-04 NOTE — PROGRESS NOTE ADULT - PROBLEM SELECTOR PLAN 3
Continue Norvasc and Losartan

## 2023-04-04 NOTE — PROGRESS NOTE ADULT - NSPROGADDITIONALINFOA_GEN_ALL_CORE
Discussed with Tanmay Alva on 3/29 for 20 minutes over the phone. Answered all the questions.
Discussed with Tanmay Alva on 3/29 for 20 minutes over the phone. Answered all the questions.
Discussed with daughter by the bedside on 4/4 for 20 minutes.  Answered all the questions.  Patient medically optimized for discharge.  Discharge planning 40 minutes - discussed with patient and consultants.

## 2023-04-04 NOTE — PROGRESS NOTE ADULT - PROBLEM SELECTOR PLAN 4
FS QID, AISS  - goal -180

## 2023-04-04 NOTE — DIETITIAN INITIAL EVALUATION ADULT - PROBLEM SELECTOR PLAN 10
- Reported hx of vertigo, however, does not appear to be contributory to patient current presentation  - Home regimen includes meclizine  - Hold meclizine while inpatient  - PT evaluation pending

## 2023-04-04 NOTE — DIETITIAN INITIAL EVALUATION ADULT - PROBLEM SELECTOR PLAN 6
- On presentation,   - Home regimen includes losartan 50 and amlodipine 5  - C/w home regimen with hold parameters  - Monitor blood pressure and follow-up orthostatic hypotension results

## 2023-04-04 NOTE — PROGRESS NOTE ADULT - REASON FOR ADMISSION
Patient/Caregiver provided printed discharge information.
Unwitnessed fall.
Patient/Caregiver provided printed discharge information.

## 2023-04-04 NOTE — PROGRESS NOTE ADULT - PROBLEM/PLAN-5
DISPLAY PLAN FREE TEXT
I have personally performed a history and physical exam on this patient and personally directed the management of the patient.
DISPLAY PLAN FREE TEXT

## 2023-04-04 NOTE — DIETITIAN INITIAL EVALUATION ADULT - PROBLEM SELECTOR PLAN 7
- On presentation, CMP glucose 345 with POCT   - Home regimen includes metformin 500 BID  - Will initiate ISS while inpatient  - Monitor FS - goal for 140-180  - A1c in AM

## 2023-04-04 NOTE — DIETITIAN INITIAL EVALUATION ADULT - ORAL INTAKE PTA/DIET HISTORY
Pt lives at home his wife. They cook together at home. No difficulty obtaining groceries. No specific diet followed PTA. Was taking multivitamin PTA.

## 2023-04-04 NOTE — DIETITIAN INITIAL EVALUATION ADULT - OTHER INFO
Per chart, 88 y/o male HTN, DM2 - A1c 9.7, glaucoma, CVA, p/w syncope/fall c/b Rt rib pain, COVID positive status but suspicious for false positive.    Nutrition interview: No recent episodes of nausea, vomiting, diarrhea or constipation, BM noted on 4/4 per RN flowsheets. Denies any chewing/swallowing difficulties. No food allergies. Stated UBW: ~165#, in line with current wt. Food preferences explored and noted. Intake is % per RN flowsheets and per pt. Feeding skills: set up help required.     Pt declined nutrition education at this time.

## 2023-04-04 NOTE — PROGRESS NOTE ADULT - PROBLEM SELECTOR PLAN 5
Will use ultram and lidocaine patch for pain control  - CT chest completed: no rib fracture noted
Will use ultram and lidocaine patch for pain control  - CT chest ordered to r/o rib fracture.
Will use ultram and lidocaine patch for pain control  - CT chest completed: no rib fracture noted
Will use ultram and lidocaine patch for pain control
Will use ultram and lidocaine patch for pain control  - CT chest completed: no rib fracture noted
Will use ultram and lidocaine patch for pain control
Will use ultram and lidocaine patch for pain control  - CT chest completed: no rib fracture noted

## 2023-10-12 PROBLEM — I10 ESSENTIAL (PRIMARY) HYPERTENSION: Chronic | Status: ACTIVE | Noted: 2023-03-28

## 2023-10-12 PROBLEM — I63.9 CEREBRAL INFARCTION, UNSPECIFIED: Chronic | Status: ACTIVE | Noted: 2023-03-28

## 2023-10-12 PROBLEM — E11.9 TYPE 2 DIABETES MELLITUS WITHOUT COMPLICATIONS: Chronic | Status: ACTIVE | Noted: 2023-03-28

## 2023-10-23 ENCOUNTER — APPOINTMENT (OUTPATIENT)
Dept: OPHTHALMOLOGY | Facility: CLINIC | Age: 88
End: 2023-10-23
Payer: MEDICARE

## 2023-10-23 ENCOUNTER — NON-APPOINTMENT (OUTPATIENT)
Age: 88
End: 2023-10-23

## 2023-10-23 PROCEDURE — 92250 FUNDUS PHOTOGRAPHY W/I&R: CPT

## 2023-10-23 PROCEDURE — 92020 GONIOSCOPY: CPT

## 2023-10-23 PROCEDURE — 92004 COMPRE OPH EXAM NEW PT 1/>: CPT

## 2023-11-13 ENCOUNTER — APPOINTMENT (OUTPATIENT)
Dept: OPHTHALMOLOGY | Facility: CLINIC | Age: 88
End: 2023-11-13
Payer: MEDICARE

## 2023-11-13 ENCOUNTER — NON-APPOINTMENT (OUTPATIENT)
Age: 88
End: 2023-11-13

## 2023-11-13 PROCEDURE — 92134 CPTRZ OPH DX IMG PST SGM RTA: CPT

## 2023-11-13 PROCEDURE — 92136 OPHTHALMIC BIOMETRY: CPT

## 2023-11-13 PROCEDURE — 92012 INTRM OPH EXAM EST PATIENT: CPT

## 2023-11-29 ENCOUNTER — NON-APPOINTMENT (OUTPATIENT)
Age: 88
End: 2023-11-29

## 2023-11-29 ENCOUNTER — APPOINTMENT (OUTPATIENT)
Dept: OPHTHALMOLOGY | Facility: EYE CENTER | Age: 88
End: 2023-11-29
Payer: MEDICARE

## 2023-11-29 PROCEDURE — 66982 XCAPSL CTRC RMVL CPLX WO ECP: CPT | Mod: LT

## 2023-11-30 ENCOUNTER — APPOINTMENT (OUTPATIENT)
Dept: OPHTHALMOLOGY | Facility: CLINIC | Age: 88
End: 2023-11-30
Payer: MEDICARE

## 2023-11-30 ENCOUNTER — NON-APPOINTMENT (OUTPATIENT)
Age: 88
End: 2023-11-30

## 2023-11-30 PROCEDURE — 99024 POSTOP FOLLOW-UP VISIT: CPT

## 2023-12-05 ENCOUNTER — APPOINTMENT (OUTPATIENT)
Dept: OPHTHALMOLOGY | Facility: CLINIC | Age: 88
End: 2023-12-05

## 2025-01-06 ENCOUNTER — APPOINTMENT (OUTPATIENT)
Dept: OPHTHALMOLOGY | Facility: CLINIC | Age: 89
End: 2025-01-06

## 2025-06-07 ENCOUNTER — APPOINTMENT (OUTPATIENT)
Dept: OPHTHALMOLOGY | Facility: CLINIC | Age: 89
End: 2025-06-07
Payer: MEDICARE

## 2025-06-07 ENCOUNTER — NON-APPOINTMENT (OUTPATIENT)
Age: 89
End: 2025-06-07

## 2025-06-07 PROCEDURE — 92014 COMPRE OPH EXAM EST PT 1/>: CPT

## 2025-06-07 PROCEDURE — 92250 FUNDUS PHOTOGRAPHY W/I&R: CPT

## 2025-06-07 PROCEDURE — 92020 GONIOSCOPY: CPT

## 2025-06-07 PROCEDURE — 92286 ANT SGM IMG I&R SPECLR MIC: CPT

## 2025-07-22 ENCOUNTER — APPOINTMENT (OUTPATIENT)
Dept: OPHTHALMOLOGY | Facility: CLINIC | Age: 89
End: 2025-07-22